# Patient Record
Sex: OTHER/UNKNOWN | Race: WHITE | NOT HISPANIC OR LATINO | URBAN - METROPOLITAN AREA
[De-identification: names, ages, dates, MRNs, and addresses within clinical notes are randomized per-mention and may not be internally consistent; named-entity substitution may affect disease eponyms.]

---

## 2017-08-11 ENCOUNTER — OFFICE VISIT - HEALTHEAST (OUTPATIENT)
Dept: INTERNAL MEDICINE | Facility: CLINIC | Age: 22
End: 2017-08-11

## 2017-08-11 DIAGNOSIS — R45.86 MOOD SWINGS: ICD-10-CM

## 2017-08-11 DIAGNOSIS — R53.83 FATIGUE: ICD-10-CM

## 2017-08-11 DIAGNOSIS — F41.9 ANXIETY: ICD-10-CM

## 2017-08-11 DIAGNOSIS — F32.A DEPRESSION: ICD-10-CM

## 2017-08-11 ASSESSMENT — MIFFLIN-ST. JEOR: SCORE: 1269.78

## 2017-09-06 ENCOUNTER — COMMUNICATION - HEALTHEAST (OUTPATIENT)
Dept: INTERNAL MEDICINE | Facility: CLINIC | Age: 22
End: 2017-09-06

## 2017-09-06 DIAGNOSIS — F41.9 ANXIETY: ICD-10-CM

## 2017-09-06 DIAGNOSIS — R45.86 MOOD SWINGS: ICD-10-CM

## 2017-09-06 DIAGNOSIS — F32.A DEPRESSION: ICD-10-CM

## 2017-09-07 ENCOUNTER — OFFICE VISIT - HEALTHEAST (OUTPATIENT)
Dept: BEHAVIORAL HEALTH | Facility: CLINIC | Age: 22
End: 2017-09-07

## 2017-09-07 ENCOUNTER — COMMUNICATION - HEALTHEAST (OUTPATIENT)
Dept: INTERNAL MEDICINE | Facility: CLINIC | Age: 22
End: 2017-09-07

## 2017-09-07 DIAGNOSIS — F31.9 BIPOLAR I DISORDER (H): ICD-10-CM

## 2017-09-07 DIAGNOSIS — F41.9 ANXIETY: ICD-10-CM

## 2017-09-07 DIAGNOSIS — R45.86 MOOD SWINGS: ICD-10-CM

## 2017-09-07 DIAGNOSIS — F32.A DEPRESSION: ICD-10-CM

## 2017-09-11 ENCOUNTER — COMMUNICATION - HEALTHEAST (OUTPATIENT)
Dept: SCHEDULING | Facility: CLINIC | Age: 22
End: 2017-09-11

## 2017-09-13 ENCOUNTER — COMMUNICATION - HEALTHEAST (OUTPATIENT)
Dept: BEHAVIORAL HEALTH | Facility: CLINIC | Age: 22
End: 2017-09-13

## 2017-09-13 ENCOUNTER — COMMUNICATION - HEALTHEAST (OUTPATIENT)
Dept: INTERNAL MEDICINE | Facility: CLINIC | Age: 22
End: 2017-09-13

## 2017-09-25 ENCOUNTER — COMMUNICATION - HEALTHEAST (OUTPATIENT)
Dept: INTERNAL MEDICINE | Facility: CLINIC | Age: 22
End: 2017-09-25

## 2017-09-25 ENCOUNTER — OFFICE VISIT - HEALTHEAST (OUTPATIENT)
Dept: INTERNAL MEDICINE | Facility: CLINIC | Age: 22
End: 2017-09-25

## 2017-09-25 DIAGNOSIS — F31.9 BIPOLAR 1 DISORDER, DEPRESSED (H): ICD-10-CM

## 2017-09-25 DIAGNOSIS — Z23 NEED FOR VACCINATION: ICD-10-CM

## 2017-09-25 DIAGNOSIS — Z23 NEED FOR IMMUNIZATION AGAINST INFLUENZA: ICD-10-CM

## 2017-09-25 ASSESSMENT — MIFFLIN-ST. JEOR: SCORE: 1270.35

## 2017-10-05 ENCOUNTER — COMMUNICATION - HEALTHEAST (OUTPATIENT)
Dept: BEHAVIORAL HEALTH | Facility: CLINIC | Age: 22
End: 2017-10-05

## 2017-11-20 ENCOUNTER — COMMUNICATION - HEALTHEAST (OUTPATIENT)
Dept: INTERNAL MEDICINE | Facility: CLINIC | Age: 22
End: 2017-11-20

## 2017-11-20 DIAGNOSIS — F31.9 BIPOLAR 1 DISORDER, DEPRESSED (H): ICD-10-CM

## 2017-12-06 ENCOUNTER — OFFICE VISIT - HEALTHEAST (OUTPATIENT)
Dept: BEHAVIORAL HEALTH | Facility: CLINIC | Age: 22
End: 2017-12-06

## 2017-12-06 ENCOUNTER — COMMUNICATION - HEALTHEAST (OUTPATIENT)
Dept: BEHAVIORAL HEALTH | Facility: CLINIC | Age: 22
End: 2017-12-06

## 2017-12-06 DIAGNOSIS — Z30.09 ENCOUNTER FOR OTHER GENERAL COUNSELING OR ADVICE ON CONTRACEPTION: ICD-10-CM

## 2017-12-06 DIAGNOSIS — F31.9 BIPOLAR 1 DISORDER, DEPRESSED (H): ICD-10-CM

## 2017-12-06 ASSESSMENT — MIFFLIN-ST. JEOR: SCORE: 1328.74

## 2017-12-18 ENCOUNTER — COMMUNICATION - HEALTHEAST (OUTPATIENT)
Dept: INTERNAL MEDICINE | Facility: CLINIC | Age: 22
End: 2017-12-18

## 2017-12-18 DIAGNOSIS — F31.9 BIPOLAR 1 DISORDER, DEPRESSED (H): ICD-10-CM

## 2018-02-02 ENCOUNTER — OFFICE VISIT - HEALTHEAST (OUTPATIENT)
Dept: BEHAVIORAL HEALTH | Facility: CLINIC | Age: 23
End: 2018-02-02

## 2018-02-02 DIAGNOSIS — F41.1 GAD (GENERALIZED ANXIETY DISORDER): ICD-10-CM

## 2018-02-02 DIAGNOSIS — F31.9 BIPOLAR 1 DISORDER, DEPRESSED (H): ICD-10-CM

## 2018-02-02 ASSESSMENT — MIFFLIN-ST. JEOR: SCORE: 1320.81

## 2018-03-10 ENCOUNTER — COMMUNICATION - HEALTHEAST (OUTPATIENT)
Dept: BEHAVIORAL HEALTH | Facility: CLINIC | Age: 23
End: 2018-03-10

## 2018-03-10 DIAGNOSIS — F31.9 BIPOLAR 1 DISORDER, DEPRESSED (H): ICD-10-CM

## 2018-06-07 ENCOUNTER — COMMUNICATION - HEALTHEAST (OUTPATIENT)
Dept: BEHAVIORAL HEALTH | Facility: CLINIC | Age: 23
End: 2018-06-07

## 2018-06-13 ENCOUNTER — OFFICE VISIT - HEALTHEAST (OUTPATIENT)
Dept: BEHAVIORAL HEALTH | Facility: CLINIC | Age: 23
End: 2018-06-13

## 2018-06-13 DIAGNOSIS — F31.9 BIPOLAR 1 DISORDER, DEPRESSED (H): ICD-10-CM

## 2018-06-13 DIAGNOSIS — F41.1 GAD (GENERALIZED ANXIETY DISORDER): ICD-10-CM

## 2018-06-13 DIAGNOSIS — R41.89 COGNITIVE CHANGES: ICD-10-CM

## 2018-06-21 ENCOUNTER — COMMUNICATION - HEALTHEAST (OUTPATIENT)
Dept: BEHAVIORAL HEALTH | Facility: CLINIC | Age: 23
End: 2018-06-21

## 2018-07-06 ENCOUNTER — COMMUNICATION - HEALTHEAST (OUTPATIENT)
Dept: BEHAVIORAL HEALTH | Facility: CLINIC | Age: 23
End: 2018-07-06

## 2018-07-24 ENCOUNTER — COMMUNICATION - HEALTHEAST (OUTPATIENT)
Dept: BEHAVIORAL HEALTH | Facility: CLINIC | Age: 23
End: 2018-07-24

## 2018-07-24 DIAGNOSIS — F31.9 BIPOLAR 1 DISORDER, DEPRESSED (H): ICD-10-CM

## 2018-08-08 ENCOUNTER — OFFICE VISIT - HEALTHEAST (OUTPATIENT)
Dept: BEHAVIORAL HEALTH | Facility: CLINIC | Age: 23
End: 2018-08-08

## 2018-08-08 DIAGNOSIS — F31.9 BIPOLAR 1 DISORDER, DEPRESSED (H): ICD-10-CM

## 2018-08-08 DIAGNOSIS — F41.1 GAD (GENERALIZED ANXIETY DISORDER): ICD-10-CM

## 2018-08-08 ASSESSMENT — MIFFLIN-ST. JEOR: SCORE: 1306.06

## 2018-09-28 ENCOUNTER — COMMUNICATION - HEALTHEAST (OUTPATIENT)
Dept: BEHAVIORAL HEALTH | Facility: CLINIC | Age: 23
End: 2018-09-28

## 2018-09-28 DIAGNOSIS — F31.9 BIPOLAR 1 DISORDER, DEPRESSED (H): ICD-10-CM

## 2018-11-01 ENCOUNTER — COMMUNICATION - HEALTHEAST (OUTPATIENT)
Dept: BEHAVIORAL HEALTH | Facility: CLINIC | Age: 23
End: 2018-11-01

## 2018-11-02 ENCOUNTER — COMMUNICATION - HEALTHEAST (OUTPATIENT)
Dept: BEHAVIORAL HEALTH | Facility: CLINIC | Age: 23
End: 2018-11-02

## 2018-11-02 DIAGNOSIS — F31.9 BIPOLAR 1 DISORDER, DEPRESSED (H): ICD-10-CM

## 2018-11-07 ENCOUNTER — COMMUNICATION - HEALTHEAST (OUTPATIENT)
Dept: BEHAVIORAL HEALTH | Facility: CLINIC | Age: 23
End: 2018-11-07

## 2018-11-07 DIAGNOSIS — F31.9 BIPOLAR 1 DISORDER, DEPRESSED (H): ICD-10-CM

## 2021-05-31 VITALS — WEIGHT: 112 LBS | BODY MASS INDEX: 18 KG/M2 | HEIGHT: 66 IN

## 2021-05-31 VITALS — WEIGHT: 118 LBS | HEIGHT: 68 IN | BODY MASS INDEX: 17.88 KG/M2

## 2021-05-31 VITALS — BODY MASS INDEX: 18.02 KG/M2 | HEIGHT: 66 IN | WEIGHT: 112.13 LBS

## 2021-06-01 VITALS — BODY MASS INDEX: 17.13 KG/M2 | WEIGHT: 113 LBS | HEIGHT: 68 IN

## 2021-06-01 VITALS — WEIGHT: 115 LBS | BODY MASS INDEX: 17.49 KG/M2

## 2021-06-01 VITALS — WEIGHT: 116.25 LBS | BODY MASS INDEX: 17.62 KG/M2 | HEIGHT: 68 IN

## 2021-06-06 ENCOUNTER — OFFICE VISIT (OUTPATIENT)
Dept: URGENT CARE | Facility: URGENT CARE | Age: 26
End: 2021-06-06
Payer: COMMERCIAL

## 2021-06-06 VITALS
HEIGHT: 67 IN | DIASTOLIC BLOOD PRESSURE: 68 MMHG | OXYGEN SATURATION: 100 % | WEIGHT: 130 LBS | BODY MASS INDEX: 20.4 KG/M2 | SYSTOLIC BLOOD PRESSURE: 112 MMHG | HEART RATE: 76 BPM

## 2021-06-06 DIAGNOSIS — S61.250A OPEN WOUND OF RIGHT INDEX FINGER DUE TO CAT BITE: Primary | ICD-10-CM
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
## 2021-06-06 PROCEDURE — 99203 OFFICE O/P NEW LOW 30 MIN: CPT | Performed by: PHYSICIAN ASSISTANT

## 2021-06-06 RX ORDER — ALBUTEROL SULFATE 90 UG/1
2 AEROSOL, METERED RESPIRATORY (INHALATION) EVERY 6 HOURS
COMMUNITY
End: 2021-07-21

## 2021-06-06 ASSESSMENT — ENCOUNTER SYMPTOMS
EYES NEGATIVE: 1
NEUROLOGICAL NEGATIVE: 1
PSYCHIATRIC NEGATIVE: 1
GASTROINTESTINAL NEGATIVE: 1
CONSTITUTIONAL NEGATIVE: 1
MUSCULOSKELETAL NEGATIVE: 1
CARDIOVASCULAR NEGATIVE: 1
RESPIRATORY NEGATIVE: 1

## 2021-06-06 ASSESSMENT — MIFFLIN-ST. JEOR: SCORE: 1359.37

## 2021-06-06 NOTE — PROGRESS NOTES
(S61.250A,  W55.01XA) Open wound of right index finger due to cat bite  (primary encounter diagnosis)  Plan: amoxicillin-clavulanate (AUGMENTIN) 875-125 MG         tablet      20 minutes spent on the date of the encounter doing chart review, history and exam, documentation and further activities per the note     See Patient Instructions  Patient Instructions     Patient Education     Cat Bite    A cat bite can cause a wound deep enough to break the skin. In such cases, the wound is cleaned and then sometimes closed. If the wound is closed it is usually not closed completely. This is so that fluid can drain if the wound becomes infected. Often the wound is left open to heal. In addition to wound care, a tetanus shot may be given, if needed.  Home care    Wash your hands well with soap and warm water before and after caring for the wound. This helps lower the risk of infection.    Care for the wound as directed. If a dressing was applied to the wound, be sure to change it as directed.    If the wound bleeds, place a clean, soft cloth on the wound. Then firmly apply pressure until the bleeding stops. This may take up to 5 minutes. Don't release the pressure and look at the wound during this time.    Always get medical attention for cat bites on the hand. They are highly likely to become infected.    Most wounds heal within 10 days. But an infection can occur even with proper treatment. So be sure to check the wound daily for signs of infection (see below).    Antibiotics may be prescribed. These help prevent or treat infection. If you re given antibiotics, take them as directed. Also be sure to complete the medicines.  Rabies prevention  Rabies is a virus that can be carried in certain animals. These can include domestic animals such as cats and dogs. Pets fully vaccinated against rabies (2 shots) are at very low risk of infection. But because human rabies is almost always fatal, any biting pet should be confined for  10 days as an extra precaution. In general, if there is a risk for rabies, the following steps may need to be taken:    If someone s pet cat has bitten you, it should be kept in a secure area for the next 10 days to watch for signs of illness. If the pet owner won t allow this, contact your local animal control center. If the cat becomes ill or dies during that time, contact your local animal control center at once so the animal may be tested for rabies. If the cat stays healthy for the next 10 days, there is no danger of rabies in the animal or you.    If a stray cat bit you, contact your local animal control center. They can give information on capture, quarantine, and animal rabies testing.    If you can t find the animal that bit you in the next 2 days, and if rabies exists in your area, you may need to receive the rabies vaccine series. Call your healthcare provider right away. Or return to the emergency department promptly.    All animal bites should be reported to the local animal control center. If you were not given a form to fill out, you can report this yourself.  Follow-up care  Follow up with your healthcare provider, or as directed.  When to seek medical advice  Call your healthcare provider right away if any of these occur:    Signs of infection:  ? Spreading redness or warmth from the wound  ? Increased pain or swelling  ? Fever of 100.4 F (38 C) or higher, or as directed by your healthcare provider  ? Colored fluid or pus draining from the wound  ? Enlarged lymph nodes above the area that was bitten, such as lymph nodes in the armpit if you were bitten on the hand or arm. This may be a sign of cat-scratch disease (cat-scratch fever).    Signs of rabies infection:  ? Headache  ? Confusion  ? Strange behavior  ? Increased salivating or drooling  ? Seizure    Decreased ability to move any body part near the bite area    Bleeding that can't be stopped after 5 minutes of firm pressure  [employer] last  reviewed this educational content on 5/1/2018 2000-2021 [employer]. All rights reserved. This information is not intended as a substitute for professional medical care. Always follow your healthcare professional's instructions.               Rafael Knapp PA-C  [facility]    Subjective   26 year old who presents to clinic today for the following health issues:    Urgent Care and Trauma       HPI     Patient visits today for single cat bite puncture wound to the tip of her right index finger earlier today. Patient states that the cat was trying to eat a treat out of her hand and misjudged the distance. Cat is fully vaccinated.     Review of Systems   Review of Systems   Constitutional: Negative.    HENT: Negative.    Eyes: Negative.    Respiratory: Negative.    Cardiovascular: Negative.    Gastrointestinal: Negative.    Genitourinary: Negative.    Musculoskeletal: Negative.    Neurological: Negative.    Psychiatric/Behavioral: Negative.         Objective        BP: 112/68 Pulse: 76     SpO2: 100 %       Physical Exam   Physical Exam  Constitutional:       General: She is not in acute distress.     Appearance: Normal appearance. She is normal weight. She is not ill-appearing, toxic-appearing or diaphoretic.   HENT:      Head: Normocephalic and atraumatic.   Cardiovascular:      Rate and Rhythm: Normal rate and regular rhythm.      Pulses: Normal pulses.      Heart sounds: Normal heart sounds. No murmur. No friction rub. No gallop.    Pulmonary:      Effort: Pulmonary effort is normal. No respiratory distress.      Breath sounds: Normal breath sounds. No stridor. No wheezing, rhonchi or rales.   Chest:      Chest wall: No tenderness.   Skin:     Comments: There is a single puncture wound to the tip pad side of the right index finger.   Neurological:      General: No focal deficit present.      Mental Status: She is alert and oriented to person, place, and time. Mental status is  at baseline.      Gait: Gait normal.   Psychiatric:         Mood and Affect: Mood normal.         Behavior: Behavior normal.         Thought Content: Thought content normal.         Judgment: Judgment normal.

## 2021-06-12 NOTE — PROGRESS NOTES
Diagnostic Assessment  [x] Brief  [] Standard    **Date(s): 2017  **Start Time:  12:00  **Stop Time: 2:00    Patient Name: Candace Moy  **Age: 22 y.o.    1995        Referral Source:   Therapist: ELA Coronado        Persons Present: Candace MATOS Farzaneh and ELA Coronado          Patient expectation for treatment:   I'd like to be less anxious about things I shouldn't be anxious about because it impairs functioning, not get depressed and apathetic, gain trust in others.  I have engaged in self-harm behavior since I was 18 years old.  I did some cutting along my arm over this past summer.  I would like to be referred to psychiatry for medication management.  I will seek an individual therapist as needed at [facility] because that will be more convenient and less expensive for me.    Recipient's description of symptoms (including reason for referral):   Patient reports symptoms of depression with panic attacks and some increase in manic symptoms and impulsivity the past several months.  She reports dealing with anxiety since middle school and with depression since high school and that both the depression and anxiety have been worse this past summer.  Symptoms she experiences include distractibility, indecisiveness, forgetfulness, racing thoughts, bothersome thoughts, worries, anxiety, nervousness, apathy, irritability, boredom, mood swings, depressed mood at times, elevated mood at other times, feelings of shame, feelings of guilt, lack of self-confidence, inferiority feelings, times when she procrastinates, need for excessive praise, loss of interest in activities.  She also reports a history of job problems and unstable relationships.  She has a history of sleep problems when depressed sleeping too much and when manic or anxious having trouble sleeping.  She also struggles sporadically with headaches and dizziness and flushes and  chills.  He is unsure whether she is experiencing any hallucinations and thinks that sometimes her thoughts almost sound like voices.    Presenting Problem/History:    Functional Impairments:   Personal: 3  Family: 2  Social:3    Work: 3      How does the presenting problem affect patients daily functioning:    Impairs my schoolwork, I'm an anxious perfectionist and don't get things on time, or get too tired to go to class. Have trouble speaking up in class, get jittery and have panic attacks, 1 in past month, more during school year. Was packing stuff to return to school here and had panic attack    Issues/Stressors:   Patient reports anxiety is problematic it is led her to quit jobs without informing staff why she is quitting.  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  She has some low wounds for her college education and financially she is lacking frons and just getting by.  He is a rolly in college and feels some stress related to passing courses and getting work done on time.  Her anxiety and periods of lack of motivation can lead her to procrastinate and put off completing assignments.  He has a brother who has experienced depression.  She reports she has a hard time taking criticism and can be very annoying and clingy toward others she reports that she becomes indecisive and impulsive when experiencing stress.  She did engage in some cutting this summer of her arms.  She reports not going to work created greater stress in her life because she felt like she was not being productive and lacked any purpose in life.    Mental Status Exam:  Grooming: Well groomed  Attire: Appropriate  Age: Appears Stated  Behavior Towards Examiner: Cooperative  Motor Activity: Within normal   Eye Contact: Appropriate  Mood: Euthymic  Affect: Congruent w/content of speech  Speech/Language: Within normal  Attention: Within  normal  Concentration: Within normal  Thought Process: Within normal  Thought Content: Within noraml  Within normal  Orientation: X 3No Evidence of Impairment  Memory: No Evidence of Impairment  Judgement: No Evidence of Impairment  Estimated Intelligence: Average  Demonstrated Insight: Adequate  Fund of Knowledge: adequate    Current living situation (including household membership and housing status):   Live in a suite dorm at [facility], living room area, and have own bedroom.     Basic needs status including economic status:   Basic needs met but drowning in student loan, parents dont make a lot of money. Didn't hold job this summer, worked only 2 days, at [employer].     Education level:   Senior year at [employer]. Want to go to grad school in a couple of years. Will apply to [employer] for post grad work    Employment status:   Student full time    Significant personal relationships (including recipient's evaluation of relationship quality:  Family, brother, best friend Jackie at [facility], have solid group of friends at school    Strengths and resources (including extent and quality of social networks):  President of the [employer], and in the [employer] club.       Belief system:  Agnostic     Contextual non-personal factors contributing to the recipient's presenting concerns:  Patient reports that she was not a very popular person growing up in that she did not have many friends in middle school and that she was not popular in high school and had a crush on her best friend that was unrecorded.  She reports cutting self harm since the age of 18 years old.  She struggled with anxiety since middle school.  She reports she comes from a stable family system although her brother has experienced depression.  Her father and mother have been  for 25 years she says that there is supportive but that her father's job is required him to move around the world a lot so she is lived in different  places and did not develop stable routed lifestyle at a young age.    General physical health and relationship to recipient's culture:  Pt culturally is open to western medical strategies, techniques and treatments. She is comfortable using medicines and working with medical doctors in this country. She has tariq in western medicine and uses medical doctors and psychotherapy to treat physical and emotional concerns. Have headaches and asthma that is exercise induced    Current medications:  See EPIC    Substance use, abuse, or dependency:  Pt denies any substance use problems or concerns.   Sources/references used in completing this assessment: (face-to-face interview, Patient chart, adult intake questionnaire, etc.)  Face-to-Face interview, Patient chart, adult intake questionnaire    **Psychological Measures:  1. PANSI: Positive ideation score=1.8<3.4; Negative ideation score= 4.2>1.6.  Patient denies suicidal thoughts and/or planning and commits to seeking safety if her is unsafe in the community.  Pt filled out screen 1 week prior and scored high risk but denies any SI intent or plan currently and will speak with MH services and her roommate at her college if suicidal in future.   2. CAGE Aid= score of     0/4    Patient is not enrolled in chemical dependency program and denies substance use problem; no referral made at this time.  3.  WHODAS: moderate problem  4. PHQ-9=score of 23  Patient indicates that their depression sxs make it very difficult to do his work, take care of things at home, or get along with other people.  5. PCL-5=not admin.  6. ANIBAL-7=score of  15 Patient indicates that their anxiety sxs make it somewhat difficult to do his work, take care of things at home, or get along with other people.  7. Mood Disorder Questionnaire (Lifetime)= 2 NO s and 11 Yes responses. Patient indicates it is:  somewhat difficult,  to manage symptoms  8. Mood Disorder Questionnaire (Current)= 4 NO s and 9 Yes  responses. Patient indicates it is: minor problem    WHODAS 2.0 12 Score -item version= 29.17% mod problem  H1= 20  H2= 0  H3= 7  In the last 30 days, patient's level of disability was at 29%       Medical History  Past Medical History:   Diagnosis Date     Depression                Clinical Impressions/Assessment/Recommendations: (Stands alone; is a synopsis of patients story, any impacting family or cultural issue on diagnosis and how patient meets criteria for diagnosis).     Candace Moy provided background information via the Adult Intake Questionnaire, psychological measures (scores are documented at the beginning of this DA), and face-to-face interview.  [facility] medical records were consulted to complete this DA.  The patient was referred to this therapist by Keyonna Patel MD.      Candace Moy is a 22 y.o. White or  female presenting to therapy for assistance with anxiety and depression and manic sx.  The patient advises her desired outcomes of therapy are to secure psychiatric services for med eval to manage MH sx with meds.  Candace Moy indicates her difficulties with anxiety and depression sx began in middle school. Problems with cutting self harm began at age 18. Pt had a crush that was unrequited for best friend in .  The patient has attempted to eliminate or manage these problems by medications, therapy.  The patient reports sporadic success in managing their mental health concerns.      Candace reports a history of anxiety going back to early teens and depression first diagnosed in high school.  She endorses symptoms of bipolar affective disorder type I and is currently concerned over resurgence of manic symptoms.  She has been doing some cutting over the summer and she reports feeling nervous anxious on edge every day and most days not being able to control her worry worrying too much about different things having trouble relaxing being restless  having trouble sleeping feeling easily annoyed and irritable she also identifies 9 symptoms of possible keny that she is currently been experiencing during the last 2 weeks of August these include feeling so good and hyper that other people thought she was not her normal self feeling so irritable that she would get into arguments needing less sleep than usual being more talkative than usual having racing thoughts being easily distracted, having more energy than usual, and being much more active than usual, and engaging in some risky and impulsive behaviors.  She reports irritability and mood swings and racing thoughts and bothersome thoughts and possible auditory hallucinations and distractibility, alternate with inferiority feelings depressed mood boredom apathy and loss of interest in activities.     She lives in a dorm at [facility] and is anxious about the start of the semester and wants to make sure that she is emotionally stable so that she can function well in school.  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  As result she spent the summer feeling out of sync and being on productive while other people she knew worked.  This created some dissidence in her and contributed to her beginning to cut on her arms again.  Candace would like a referral to our psychiatry department for medication management of her bipolar affective disorder.  She will utilize the therapist at [facility] for individual therapy.  This will be more convenient for her and will save her some money.      Based on the information gathered in this diagnostic assessment, the patient's reported symptoms meet criteria for the following DSM-5 Diagnosis and associated rule-outs:      Diagnosis:     BPAD most recent episode mixed with possible psychosis evidenced by  uncertainty over whether she has had some hallucinations      Rule outs include:    ANIBAL with Panic Attacks    BPD      It is recommended that the patient begin individual psychotherapy at [facility] with follow-up appointments scheduled as needed.  Also, the patient will be referred to psychiatry for additional care.      Candace Moy would be best serviced by therapeutic interventions that provide a client centered atmosphere with positive regard.  In addition, the patient may benefit from   CBT, mindfulness and group therapies, along with Motivational Interviewing.        Assessment of client resolving presenting mental health concerns:  Ability  [] low     [x] average     [] high  Motivation [] low     [x] average     [] high  Willingness [] low     [x] average     [] high        Initial Therapy Plan (ex: develop therapeutic relationship with therapist, Refer to psychiatry/psych testing, etc.):    1. Attend psychiatric appointment at [facility] to manage psych meds    2. Secure individual therapist at [facility] for ongoing MH care    3.  Reasonable precautions should be taken to assess patient safety in the community.            Therapist s Signature/Supervision/co-signature statement:   Performed and documented by RENE iMller, LICSW [facility]

## 2021-06-13 NOTE — PROGRESS NOTES
[facility] Follow Up Note    Assessment/Plan:    1. Bipolar 1 disorder, depressed  Overall patient's mood fluctuations are much better on current regimen.  She denies recurrent episodes of hypomania although complains about more fatigue now which could be a relative.  For now I opted not to change any of her medications.  She will continue Lamictal 100 mg and Celexa 10 mg.  I recommended that she establishes care with psychiatry.  She will continue seeing our psychologist  - lamoTRIgine (LAMICTAL) 100 MG tablet; Take 1 tablet (100 mg total) by mouth daily.  Dispense: 30 tablet; Refill: 6    2. Need for immunization against influenza  - Influenza, Seasonal,Quad Inj, 36+ MOS    3. Need for vaccination  - HPV vaccine 9 valent 3 dose IM  - Tdap vaccine,  8yo or older,  IM    Keyonna Patel MD    Chief Complaint:  Chief Complaint   Patient presents with     Follow-up     1 month f/u for depression/anxiety/mood swings        History of Present Illness:  Candace is a 22 y.o. female with history of exercise-induced asthma and mood swings, was recently diagnosed bipolar 1 disorder who is currently here for follow-up.    Since I last saw her we decreased her citalopram from 20-10 mg due to hypomania and started her on Lamictal.  Currently she is on Lamictal 100 mg a day and feels that her mood is multilevel, without too many highs or too many lows.  She still has mild depressive symptoms and overall her PHQ 9 was 14 but mostly due to physical symptoms of fatigue and not sleeping well.  Since she has been on Lamictal she reports that she has been having vivid dreams.  She also wakes up frequently at night for unclear reason but able to fall asleep easily.  Overall she gets total of 6 hours per night.  Her depression has not gotten worse since we decreased citalopram.  She denies any rashes while she has been on Lamictal.  She has been seeing our psychologist.  We discussed that she should also see a  "psychiatrist for further adjustment of her Lamictal and psychiatric medications.  She reports that she has had phone call from some office but has not returned it.  Currently patient is back at school and somewhat stressed.  Overall she feels that mentally she is doing better than in the summer.    Review of Systems:  A comprehensive review of systems was performed and was otherwise negative.  She reports more fatigue recently although it is relative given the fact that she is not getting any more hypomania episodes.    PFSH:  Social History: Reviewed, patient is not sexually active  History   Smoking Status     Never Smoker   Smokeless Tobacco     Never Used     Social History     Social History Narrative    Patient lives with one roommate.  She is a senior in [employer] and majors in English literature.  She is from [place] originally.       Past History: Reviewed  Current Outpatient Prescriptions   Medication Sig Dispense Refill     albuterol (PROAIR HFA) 90 mcg/actuation inhaler Inhale 2 puffs every 6 (six) hours as needed for wheezing.       citalopram (CELEXA) 20 MG tablet Take 20 mg by mouth daily.       hydrOXYzine (VISTARIL) 25 MG capsule Take 25-50 mg by mouth every 6 (six) hours as needed for itching.       lamoTRIgine (LAMICTAL) 100 MG tablet Take 1 tablet (100 mg total) by mouth daily. 30 tablet 6     No current facility-administered medications for this visit.        Family History: Reviewed    Physical Exam:    Vitals:    09/25/17 1620   BP: 94/62   Pulse: 66   Resp: 16   Temp: 98.5  F (36.9  C)   TempSrc: Oral   SpO2: 98%   Weight: 112 lb 2 oz (50.9 kg)   Height: 5' 6\" (1.676 m)     Wt Readings from Last 3 Encounters:   09/25/17 112 lb 2 oz (50.9 kg)   08/11/17 112 lb (50.8 kg)     Body mass index is 18.1 kg/(m^2).    Constitutional:  Reveals a pleasant young female.  Vitals:  Per nursing notes.  HEENT:No cervical LAD, no thyromegaly,  conjunctiva is pink, no scleral icterus, TMs are " visualized and normal bl, oropharynx is clear, no exudates,   Cardiac:  Regular rate and rhythm,no murmurs, rubs, or gallops. Legs without edema. Palpation of the radial pulse regular.  Lungs: Clear to auscultation bl.  Respiratory effort normal.  Abdomen:positive BS, soft, nontender, nondistended.  No hepato-splenomagaly  Skin:   Without rash, bruise, or palpable lesions.  Psychiatric: affect appropriate, memory intact.  She appears to be less anxious today, no flight of ideas or pressured speech, she has a better eye contact today      Data Review:    Analysis and Summary of Old Records (2): Yes    Records Requested (1): No    Other History Summarized (from other people in the room) (2): No    Radiology Tests Summarized (XRAY/CT/MRI/DXA) (1): No    Labs Reviewed (1): Yes    Medicine Tests Reviewed (EKG/ECHO/COLONOSCOPY/EGD) (1): No    Independent Review of EKG or X-RAY (2): No
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
## 2021-06-14 NOTE — PROGRESS NOTES
Pt states she is here due to her bipolar disorder. Pt states she has minimal amount of anxiety rates it as 2/5, pt states she has weekly panic attacks due to anxiety. Pt states her depression is better then it was before. Pt states she had a breakdown this past summer but is doing much better now.pt states she only started her medications since March of this year. Pt denies SI/HI and hallucinations.        Correct pharmacy verified with patient and confirmed in snapshot? [x] yes []no    Medications Phoned  to Pharmacy [] yes [x]no  Name of Pharmacist:  List Medications, including dose, quantity and instructions      Medication Prescriptions given to patient   [] yes  [x] no   List the name of the drug the prescription was written for.      Medications ordered this visit were e-scribed.  Verified by order class [x] yes  [] no  Lamictal, Celexa    Medication changes or discontinuations were communicated to patient's pharmacy:  [] yes  [x] no  Pharmacist Spoke With:     UA collected [] yes  [x] no    [place] Prescription Monitoring Program Reviewed? [x] yes  [] no    Referrals/Labs were made to:     Completed Charge Capture?  [x] yes  [] no    Future appointment was made: [x] yes  [] no  1/24/18  Dictation completed at time of chart check: [x] yes  [] no    I have checked the documentation for today s encounters and the above information has been reviewed and completed.

## 2021-06-15 ENCOUNTER — NURSE TRIAGE (OUTPATIENT)
Dept: NURSING | Facility: CLINIC | Age: 26
End: 2021-06-15

## 2021-06-15 ENCOUNTER — OFFICE VISIT (OUTPATIENT)
Dept: URGENT CARE | Facility: URGENT CARE | Age: 26
End: 2021-06-15
Payer: COMMERCIAL

## 2021-06-15 VITALS
DIASTOLIC BLOOD PRESSURE: 66 MMHG | SYSTOLIC BLOOD PRESSURE: 104 MMHG | OXYGEN SATURATION: 99 % | RESPIRATION RATE: 14 BRPM | HEART RATE: 77 BPM | BODY MASS INDEX: 20.4 KG/M2 | TEMPERATURE: 99.3 F | HEIGHT: 67 IN | WEIGHT: 130 LBS

## 2021-06-15 DIAGNOSIS — L50.9 HIVES: Primary | ICD-10-CM

## 2021-06-15 DIAGNOSIS — T78.3XXA ANGIOEDEMA, INITIAL ENCOUNTER: ICD-10-CM

## 2021-06-15 PROCEDURE — 99213 OFFICE O/P EST LOW 20 MIN: CPT | Performed by: NURSE PRACTITIONER

## 2021-06-15 RX ORDER — PREDNISONE 10 MG/1
40 TABLET ORAL ONCE
Status: COMPLETED | OUTPATIENT
Start: 2021-06-15 | End: 2021-06-15

## 2021-06-15 RX ORDER — PREDNISONE 20 MG/1
TABLET ORAL
Qty: 6 TABLET | Refills: 0 | Status: SHIPPED | OUTPATIENT
Start: 2021-06-15 | End: 2021-07-20

## 2021-06-15 RX ORDER — DIPHENHYDRAMINE HCL 25 MG
50 CAPSULE ORAL ONCE
Status: COMPLETED | OUTPATIENT
Start: 2021-06-15 | End: 2021-06-15

## 2021-06-15 RX ADMIN — Medication 50 MG: at 15:46

## 2021-06-15 RX ADMIN — PREDNISONE 40 MG: 10 TABLET ORAL at 15:48

## 2021-06-15 ASSESSMENT — ENCOUNTER SYMPTOMS
ABDOMINAL PAIN: 0
ARTHRALGIAS: 0
APPETITE CHANGE: 0
HEADACHES: 0
LIGHT-HEADEDNESS: 0
WHEEZING: 0
CONFUSION: 0
CHEST TIGHTNESS: 0
FATIGUE: 0
PALPITATIONS: 0
PARESTHESIAS: 0
DIZZINESS: 0
COUGH: 0
CHILLS: 0
WEAKNESS: 0
SHORTNESS OF BREATH: 0
ACTIVITY CHANGE: 0
DIAPHORESIS: 0
VOMITING: 0
COLOR CHANGE: 1
NAUSEA: 0
MYALGIAS: 0
FEVER: 0
NUMBNESS: 0
NERVOUS/ANXIOUS: 1

## 2021-06-15 ASSESSMENT — MIFFLIN-ST. JEOR: SCORE: 1359.37

## 2021-06-15 NOTE — PATIENT INSTRUCTIONS
Prednisone and benadryl in clinic or hives and angioedema  Watchful waiting for 1-2 days for anaphylaxis  ER for Epi if severe shortness of breath, throat closure, heart palpitations, dizziness, nausea, faint  Take an antihistamine such as Claritin, Zyrtec or Allegra (loratadine, cetirizine and fexofenadine) daily for 5-7 days   Benadryl at night as needed for sleep  Avoid scratching.  Cool packs and cool showers.  Calamine lotion as needed  May apply over the counter hydrocortisone cream as needed for itch.  Discussed watching for more severe symptoms, including shortness of breath, swelling of lips, tongue, diffuculty breathing or worsening hives. Must be seen in emergency room immediately or call 911.   Follow up with primary care provider if no improvement.    Urticaria is common, affecting up to 20 percent of the population. A presumptive trigger, such as common viral and bacterial infections, medications, food ingestion, or insect sting, can sometimes be identified for new-onset urticaria. Viral tends to not be highly pruritic. In patients with mild symptoms of new-onset urticaria, we suggest treatment with a nonsedating H1 antihistamine alone. In patients at low risk of complications from anticholinergic side effects (ie, young, healthy patients), use of a sedating H1 antihistamine at bedtime and a nonsedating H1 antihistamine during the day is a reasonable alternative. In patients with moderate-to-severe new-onset urticaria, we suggest adding an H2 antihistamine. In patients with prominent angioedema or persistent symptoms despite an H1 and H2 antihistamine, we suggest adding a brief course of oral glucocorticoids. We typically administer prednisone (30 to 60 mg daily) in adults or prednisolone (0.5 to 1 mg/kg/day) in children, tapered over five to seven days (Upto[employer], 2019).    Patient Education     Angioedema  Angioedema (AX-mww-rz-eh-MIRIAM-muh) is a sudden appearance of swollen patches (edema) on the skin  or mucous membranes. It most often involves the face, lips, mouth, tongue, back of throat, or vocal cords. It may also occur in other places, such as the arms, legs, or genitals. A rash may also appear during the first 4 days of this illness.  There are different types of angioedema. Sometimes angioedema is part of an allergic reaction (allergic angioedema). Other times angioedema is present without any other signs of allergic reaction (isolated angioedema). Your symptoms will depend on what type of angioedema you have. Like allergic reactions, angioedema may include:    Rash, hives, redness, welts, blisters    Itching, burning, stinging, pain    Dry, flaky, cracking, or scaly skin    Swelling of the face, lips, tongue, or other parts of the body  More severe symptoms may include:    Trouble swallowing, or feeling like your throat is closing    Trouble breathing or wheezing    Hoarse voice or trouble speaking    Nausea, vomiting, diarrhea, or stomach cramps    Feeling faint or lightheaded, rapid heart rate, or low blood pressure  Angioedema can be triggered by exposure to certain substances. Medical conditions involving the immune systems and certain infections may cause it. In rare cases, angioedema can be hereditary. Sometimes the cause may be very clear. However, it's often hard to find a cause. The most common causes of allergic angioedema include:    Foods, such as shrimp, shellfish, peanuts, milk products, gluten, and eggs; also colorings, flavorings, and additives    Insect bites or stings, from bees, mosquitoes, fleas, or ticks    Medicines, such as ACE inhibitors, penicillin medicines, sulfa medicines, , aspirin, and ibuprofen    Latex, which may be in gloves, clothes, toys, balloons, and some kinds of tape. People who are allergic to latex may have problems with foods such as bananas, avocados, kiwi, papaya, or chestnuts.    Stress    Heat, cold, or sunlight  The most common cause of angioedema is a  reaction to a class of medicines called ACE inhibitors. These are used to treat high blood pressure. ACE inhibitors include lotensin, captopril, enalapril, and lisinopril. Angiodema can happen even after you have been taking the medicine for some time. Tell your healthcare provider if you have angioedema symptoms and are taking any of these medicines. Angioedema may recur. It's important to watch for the earliest signs of this condition (see the list below). Contact your healthcare provider right away if swelling involves the face, mouth, or throat.  Home care  Rest quietly today. Don't do vigorous physical activity.  Medicines. The healthcare provider may prescribe medicines for itching, swelling, or pain. Follow the healthcare provider s instructions when taking these medicines.    Oral diphenhydramine is an antihistamine available without a prescription. Unless a prescription antihistamine was given, diphenhydramine may be used to reduce widespread itching. It may make you sleepy, so be careful using it when going to school, working, or driving. (Note: Don't use diphenhydramine if you have glaucoma or if you are a man who has trouble urinating due to an enlarged prostate.) Loratadine is an antihistamine that may cause less drowsiness.    Don't use diphenhydramine cream on your skin. Some people can have an allergic reaction to this.    Calamine lotion or oatmeal baths sometimes help with itching.    You may use acetaminophen or ibuprofen for pain, unless another pain medicine was prescribed.    If you were told that your angioedema was caused by a medicine you are taking, you must stop taking it. Ask your healthcare provider for a different one. In the future, advise medical staff that you are allergic to this medicine.    If medicine was prescribed, such as steroids or antihistamines, be sure you understand what the medicine is and how to take it.   General care    Make sure you don't scratch areas of the body  that had a reaction. This will help prevent infection.     Stay away from air pollution, tobacco, and wood smoke. Also stay away from cold temperatures. These things can make allergy symptoms worse.    Try to find out what caused your reaction. Make sure to remove the allergen. Future reactions may be worse.     If you have a serious allergy, wear a medical alert bracelet that notes this allergy.    If the healthcare provider prescribed an epinephrine auto injector kit, keep it with you at all times.     Tell all care providers about your allergy. Ask them how to use any prescribed medicines.    Keep a record of allergies and symptoms, and when they occurred. This will help your provider treat you over time.     Follow-up care  Follow up with your healthcare provider, or as advised. You may need to see an allergist. An allergist can help find the cause of an allergic reaction and give recommendations on how to prevent future reactions.  Call 911  Call 911 right away if any of these occur:    Trouble breathing or swallowing, or wheezing    Hoarse voice or trouble speaking, or drooling    Chest pain or tightness    Confusion, lightheadedness, or dizziness    Extreme drowsiness or trouble awakening    Fainting or loss of consciousness    Rapid heart rate    Vomiting blood, or large amounts of blood in stool    Seizure    Nausea, vomiting, diarrhea, abdominal pain, or stomach cramps  When to seek medical attention  Call your healthcare provider right away if any of the following occur:    Symptoms don't go away    Symptoms come back    Symptoms get worse or new symptoms develop    Hives feel uncomfortable    Fever of 100.4 F (38 C), or as directed by your healthcare provider  [employer] last reviewed this educational content on 8/1/2019 2000-2021 [employer]. All rights reserved. This information is not intended as a substitute for professional medical care. Always follow your healthcare professional's  instructions.

## 2021-06-15 NOTE — PROGRESS NOTES
"Psychiatric  Progress Note  Date of visit:2/2/2018         Discussion of Care and Treatment Recommendations:   This is a 23 y.o. female with a history of bipolar affective disorder who presents to the clinic today to establish psychiatric care for medication management.  Patient also has a history of  self-harm by cutting since the age of 18.      Last visit 12/6/17  Recommendation at last visit .    1.Recommends Psychotherapy:   She will utilize the therapist at [facility] for individual therapy  2. Ambulatory Referral to [facility]   3-Continue with current medication : Lamictal 100 mg daily , Celexa 10 mg daily , Hydroxyzine 25 mg - 50 mg every 6 hrs as needed - Anxiety   6- RTC- 6 weeks , call in between visit with any questions or concerns         Patient and I reviewed diagnosis and treatment plan and patient agrees with following recommendations:  Ongoing education given regarding diagnostic and treatment options with adequate verbalization of understanding.  Plan   1.Recommends Psychotherapy:   She will utilize the therapist at [facility] for individual therapy  2. Ambulatory Referral to [facility]   3-Continue with current medication : Lamictal 100 mg daily , Celexa 10 mg daily , Hydroxyzine 25 mg - 50 mg every 6 hrs as needed - Anxiety   6- RTC- 6 weeks , call in between visit with any questions or concerns              Diagnoses:     Impression:      Bipolar affective Disorder       R/O ANIBAL, BPD             Chief Complaint / Subjective:    Chief complaint: \"I am doing okay\"     History of Present Illness:  Per patient statement-her anxiety is down she is feeling better in this new semester as she was able to pass her classes last semester.  She has an upcoming appointment with the psychologist at school next week and hopes to start psychotherapy soon.  She has been taking her medications consistently and denies any side effects.  She has not had any " panic attacks even though she has experienced some anxiety and was able to utilize hydroxyzine as needed with relief.  She is dating a boy and is sexually active.  During our last visit we discussed the importance of birth control.  She is yet to make an appointment with her primary care for this.  I again educated her on the importance of birth control and the possibility of endangering and unborn baby while on neuroleptic medications.  She endorsed understanding.  She tells me she will call her PCP this week to make that appointment.  She denies suicidal homicidal ideations.  She tells me she feels safe she verbally contracts for safety.  She denies keny or hypomania symptoms.  She denies paranoia or delusions.  She denies depressions.  She tells me anxiety is under control with her current medications.  I will make no changes to her current medications.  I have renewed her prescriptions.  I will have her return in 8 weeks for follow-up appointment and I encouraged her to call in between visits with any questions or concerns.    Mental Status Examination:   General: Adequate hygiene, cooperative  Speech: Normal in rate and tone  Language: Intact  Thought process: Coherent  Thought content:                           Auditory hallucinations- absent                           Visual Hallucinations - Absent                           Delusions Absent                           Loose Associations:  No                          Suicidal thoughts: Absent                          Homicidal thoughts: Absent                       Affect: Neutral  Mood: Neutral   Intellectual functioning: Within normal limits  Memory: Within normal limits  Fund of knowledge: Average  Attention and concentration: Within normal limits  Gait: Steady  Psychomotor activity: Calm, no agitation  Muscles: No atrophy, no abnormal movements  InSight and judgment: Fair    Medication changes: See Above   Medication adherence: compliant  Medication side  "effects: absent  Information about medications: Side effects, benefits and alternative treatments discussed and patient agrees with capacity to do so.    Psychotherapy: Supportive therapy day-to-day living    Education: Diet, exercise, abstinence from drugs and alcohol, patient will not drive if sedated and medications or  under influence of any substance    Lab Results:   Personally reviewed and discussed with the patient    Lab Results   Component Value Date    WBC 4.4 08/11/2017    HGB 13.0 08/11/2017    HCT 39.0 08/11/2017     08/11/2017    ALT 7 08/11/2017    AST 15 08/11/2017     08/11/2017    K 3.8 08/11/2017     08/11/2017    CREATININE 0.82 08/11/2017    BUN 12 08/11/2017    CO2 22 08/11/2017    TSH 3.98 08/11/2017       Vital signs:  Vitals:    02/02/18 0844   BP: 96/54   Patient Site: Left Arm   Patient Position: Sitting   Cuff Size: Adult Regular   Pulse: (!) 59   Temp: 97.6  F (36.4  C)   TempSrc: Oral   Weight: 116 lb 4 oz (52.7 kg)   Height: 5' 8\" (1.727 m)     Allergies: Other food allergy         Medications:     Current Outpatient Prescriptions on File Prior to Visit   Medication Sig Dispense Refill     albuterol (PROAIR HFA) 90 mcg/actuation inhaler Inhale 2 puffs every 6 (six) hours as needed for wheezing.       citalopram (CELEXA) 10 MG tablet Take 1 tablet (10 mg total) by mouth daily. 30 tablet 1     hydrOXYzine (VISTARIL) 25 MG capsule Take 25-50 mg by mouth every 6 (six) hours as needed for itching.       lamoTRIgine (LAMICTAL) 100 MG tablet Take 1 tablet (100 mg total) by mouth daily. 30 tablet 1     No current facility-administered medications on file prior to visit.             Review of Systems:    Otherwise reminder of review of systems is negative    Coordination of Care:   More than 25 minutes spent on this visit  with more than 50% of time spent on coordination of care including: Educating patient about diagnosis, prognosis, side effects and benefits of " medications, diet, exercise.  Time also spent on entering orders and preparing documentation for the visit.Time also spent providing supportive therapy regarding above issues.    This note was created using a dictation system. All typing errors or contextual distortion is unintentional and software inherent.

## 2021-06-15 NOTE — PROGRESS NOTES
Correct pharmacy verified with patient and confirmed in snapshot? [x] yes []no    Charge captured ? [x] yes  [] no    Medications Phoned  to Pharmacy [] yes [x]no  Name of Pharmacist:  List Medications, including dose, quantity and instructions      Medication Prescriptions given to patient   [] yes  [x] no   List the name of the drug the prescription was written for.       Medications ordered this visit were e-scribed.  Verified by order class [x] yes  [] no    Medication changes or discontinuations were communicated to patient's pharmacy: [] yes  [x] no   n/a  UA collected [] yes  [x] no    [place] Prescription Monitoring Program Reviewed? [] yes  [x] no    Referrals were made to:  n/a  Future appointment was made: [x] yes  [] no   4/6/18  Dictation completed at time of chart check: [x] yes  [] no    I have checked the documentation for today s encounters and the above information has been reviewed and completed.     PROVIDER:[TOKEN:[[id number]]]

## 2021-06-15 NOTE — TELEPHONE ENCOUNTER
Patient reports that she woke up 2 hours ago with hives on her arms and legs. Patient reports that her lips are slightly swollen and feel odd. She denies any tongue swelling or difficulty breathing. Patient did not eat any new foods or can think of anything different she has encountered. She does report that she has been on Augmentin for over a week, has a couple of days of medication left.    Patient is advised to go to  for evaluation at this time due to lips being involved and currently on antibiotics. Patient is agreeable with plan and denies further questions at this time.    Taylor Deluca RN  [facility]        Reason for Disposition    Patient sounds very sick or weak to the triager    Additional Information    Negative: Difficulty breathing or wheezing now    Negative: Rapid onset of swollen tongue    Negative: Rapid onset of hoarseness or cough    Negative: Very weak (can't stand)    Negative: Difficult to awaken or acting confused (e.g., disoriented, slurred speech)    Negative: Life-threatening reaction (anaphylaxis) in the past to similar substance (e.g., food, insect bite/sting, chemical, etc.) and < 2 hours since exposure    Negative: Sounds like a life-threatening emergency to the triager    Negative: Bee, wasp, or yellow jacket sting within last 24 hours    Negative: Taking a new medicine now or within last 3 days (Exception: antihistamine, decongestant or other OTC cough/cold medicines)    Negative: Doesn't match the SYMPTOMS of hives    Negative: Swollen tongue    Negative: Widespread hives, itching, or facial swelling and onset < 2 hours of exposure to high-risk allergen (e.g., 1st dose of antibiotic, nuts, sting)    Negative: Difficulty breathing or wheezing    Negative: Hoarseness or cough that started soon after 1st dose of drug    Negative: Swollen tongue that started soon after first dose of drug    Negative: Fever and purple or blood-colored spots or dots     Negative: Too weak or sick to stand    Negative: Sounds like a life-threatening emergency to the triager    Negative: Rash is only on 1 part of the body (localized)    Negative: Taking new non-prescription (OTC) antihistamine, decongestant, ear drops, eye drops, or other OTC cough/cold medicine    Negative: Taking new prescription antihistamine, allergy medicine, asthma medicine, eye drops, ear drops or nose drops    Negative: Rash started more than 3 days after stopping new prescription medicine    Negative: Swollen tongue    Negative: Widespread hives and onset < 2 hours of exposure to 1st dose of drug    Negative: Patient sounds very sick or weak to the triager    Face or lip swelling    Negative: Fever    Protocols used: HIVES-A-OH, RASH - WIDESPREAD WHILE ON DRUGS-A-OH    COVID 19 Nurse Triage Plan/Patient Instructions    Please be aware that novel coronavirus (COVID-19) may be circulating in the community. If you develop symptoms such as fever, cough, or SOB or if you have concerns about the presence of another infection including coronavirus (COVID-19), please contact your health care provider or visit [email or web address].     Disposition/Instructions    In-Person Visit with provider recommended. Reference Visit Selection Guide.    Thank you for taking steps to prevent the spread of this virus.  o Limit your contact with others.  o Wear a simple mask to cover your cough.  o Wash your hands well and often.    Resources    [facility]: About COVID-19: [email or web address]    CDC: What to Do If You're Sick: www.cdc.gov/coronavirus/2019-ncov/about/steps-when-sick.html    CDC: Ending Home Isolation: www.cdc.gov/coronavirus/2019-ncov/hcp/disposition-in-home-patients.html     CDC: Caring for Someone: www.cdc.gov/coronavirus/2019-ncov/if-you-are-sick/care-for-someone.html     [facility]: Interim Guidance for Hospital Discharge to Home:  [email or web address]/hcp/hospdischarge.pdf    [facility] clinical trials (COVID-19 research studies): [email or web address]/umn-clinical-trials     Below are the COVID-19 hotlines at the [facility] of Health ([facility]). Interpreters are available.   o For health questions: Call 722-076-4509 or 1-683.242.2544 (7 a.m. to 7 p.m.)  o For questions about schools and childcare: Call [phone] or 1-819.990.8097 (7 a.m. to 7 p.m.)

## 2021-06-15 NOTE — PROGRESS NOTES
Chief Complaint   Patient presents with     Urgent Care     Derm Problem     woke up with rash all over, itching. Some swelling with lips earlier. Has been Augmentin since June 6     SUBJECTIVE:  Candace Moy is a 26 year old female who presents to the clinic today with her friend with an itchy rash since this morning. It is highly pruritic and covers her entire body including her lips. Noted mild lip swelling today, but it appears there are hives on the lips. She is on day 9 of Augmentin following a cat bite. Denies shortness of breath, throat closure, palpitations, dizziness, nausea, faint feeling. Tried atarax earlier.    No past medical history on file.  Fexofenadine HCl (ALLERGY 24-HR PO),   albuterol (PROAIR HFA/PROVENTIL HFA/VENTOLIN HFA) 108 (90 Base) MCG/ACT inhaler, Inhale 2 puffs into the lungs every 6 hours  VITAMIN D PO,     No current facility-administered medications on file prior to visit.     Social History     Tobacco Use     Smoking status: Never Smoker     Smokeless tobacco: Never Used   Substance Use Topics     Alcohol use: Not on file     Allergies   Allergen Reactions     Banana Anaphylaxis     Augmentin Rash     Drug eruption rash, hives, lip swelling on day 9 of Augmentin. Also notes potential rash when little with Amox.     Other Food Allergy      Bananas-tongue swells up and mouth gets itchy.      Review of Systems   Constitutional: Negative for activity change, appetite change, chills, diaphoresis, fatigue and fever.   Respiratory: Negative for cough, chest tightness, shortness of breath and wheezing.    Cardiovascular: Negative for chest pain, palpitations and peripheral edema.   Gastrointestinal: Negative for abdominal pain, nausea and vomiting.   Musculoskeletal: Negative for arthralgias and myalgias.   Skin: Positive for color change and rash.   Neurological: Negative for dizziness, weakness, light-headedness, numbness, headaches and paresthesias.   Psychiatric/Behavioral:  "Negative for confusion. The patient is nervous/anxious.      EXAM:   /66   Pulse 77   Temp 99.3  F (37.4  C) (Oral)   Resp 14   Ht 1.689 m (5' 6.5\")   Wt 59 kg (130 lb)   LMP 05/17/2021   SpO2 99%   BMI 20.67 kg/m      Physical Exam  Vitals signs reviewed.   Constitutional:       General: She is not in acute distress.     Appearance: Normal appearance. She is not ill-appearing, toxic-appearing or diaphoretic.   HENT:      Head: Normocephalic and atraumatic.      Mouth/Throat:      Mouth: Mucous membranes are moist.      Pharynx: Oropharynx is clear. No oropharyngeal exudate or posterior oropharyngeal erythema.      Comments: Lips with pinpoint hives, drug eruption rash on them. Tongue and airway perfectly normal.  Cardiovascular:      Rate and Rhythm: Normal rate.      Pulses: Normal pulses.   Pulmonary:      Effort: Pulmonary effort is normal. No respiratory distress.      Breath sounds: Normal breath sounds. No stridor. No wheezing.   Musculoskeletal: Normal range of motion.   Skin:     General: Skin is warm and dry.      Findings: Erythema and rash present.   Neurological:      General: No focal deficit present.      Mental Status: She is alert and oriented to person, place, and time.      Cranial Nerves: No cranial nerve deficit.      Sensory: No sensory deficit.      Motor: No weakness.      Gait: Gait normal.   Psychiatric:         Mood and Affect: Mood normal.         Behavior: Behavior normal.       ASSESSMENT:    ICD-10-CM    1. Hives  L50.9 predniSONE (DELTASONE) tablet 40 mg     diphenhydrAMINE (BENADRYL) capsule 50 mg     predniSONE (DELTASONE) 20 MG tablet   2. Angioedema, initial encounter  T78.3XXA predniSONE (DELTASONE) tablet 40 mg     diphenhydrAMINE (BENADRYL) capsule 50 mg     predniSONE (DELTASONE) 20 MG tablet     PLAN:  Patient Instructions   Prednisone and benadryl in clinic or hives and angioedema  Watchful waiting for 1-2 days for anaphylaxis  ER for Epi if severe shortness of " breath, throat closure, heart palpitations, dizziness, nausea, faint  Take an antihistamine such as Claritin, Zyrtec or Allegra (loratadine, cetirizine and fexofenadine) daily for 5-7 days   Benadryl at night as needed for sleep  Avoid scratching.  Cool packs and cool showers.  Calamine lotion as needed  May apply over the counter hydrocortisone cream as needed for itch.  Discussed watching for more severe symptoms, including shortness of breath, swelling of lips, tongue, diffuculty breathing or worsening hives. Must be seen in emergency room immediately or call 911.   Follow up with primary care provider if no improvement.    Urticaria is common, affecting up to 20 percent of the population. A presumptive trigger, such as common viral and bacterial infections, medications, food ingestion, or insect sting, can sometimes be identified for new-onset urticaria. Viral tends to not be highly pruritic. In patients with mild symptoms of new-onset urticaria, we suggest treatment with a nonsedating H1 antihistamine alone. In patients at low risk of complications from anticholinergic side effects (ie, young, healthy patients), use of a sedating H1 antihistamine at bedtime and a nonsedating H1 antihistamine during the day is a reasonable alternative. In patients with moderate-to-severe new-onset urticaria, we suggest adding an H2 antihistamine. In patients with prominent angioedema or persistent symptoms despite an H1 and H2 antihistamine, we suggest adding a brief course of oral glucocorticoids. We typically administer prednisone (30 to 60 mg daily) in adults or prednisolone (0.5 to 1 mg/kg/day) in children, tapered over five to seven days ([employer], 2019).    Patient Education     Angioedema  Angioedema (IK-ubp-cs-eh-MIRIAM-muh) is a sudden appearance of swollen patches (edema) on the skin or mucous membranes. It most often involves the face, lips, mouth, tongue, back of throat, or vocal cords. It may also occur in other  places, such as the arms, legs, or genitals. A rash may also appear during the first 4 days of this illness.  There are different types of angioedema. Sometimes angioedema is part of an allergic reaction (allergic angioedema). Other times angioedema is present without any other signs of allergic reaction (isolated angioedema). Your symptoms will depend on what type of angioedema you have. Like allergic reactions, angioedema may include:    Rash, hives, redness, welts, blisters    Itching, burning, stinging, pain    Dry, flaky, cracking, or scaly skin    Swelling of the face, lips, tongue, or other parts of the body  More severe symptoms may include:    Trouble swallowing, or feeling like your throat is closing    Trouble breathing or wheezing    Hoarse voice or trouble speaking    Nausea, vomiting, diarrhea, or stomach cramps    Feeling faint or lightheaded, rapid heart rate, or low blood pressure  Angioedema can be triggered by exposure to certain substances. Medical conditions involving the immune systems and certain infections may cause it. In rare cases, angioedema can be hereditary. Sometimes the cause may be very clear. However, it's often hard to find a cause. The most common causes of allergic angioedema include:    Foods, such as shrimp, shellfish, peanuts, milk products, gluten, and eggs; also colorings, flavorings, and additives    Insect bites or stings, from bees, mosquitoes, fleas, or ticks    Medicines, such as ACE inhibitors, penicillin medicines, sulfa medicines, , aspirin, and ibuprofen    Latex, which may be in gloves, clothes, toys, balloons, and some kinds of tape. People who are allergic to latex may have problems with foods such as bananas, avocados, kiwi, papaya, or chestnuts.    Stress    Heat, cold, or sunlight  The most common cause of angioedema is a reaction to a class of medicines called ACE inhibitors. These are used to treat high blood pressure. ACE inhibitors include lotensin,  captopril, enalapril, and lisinopril. Angiodema can happen even after you have been taking the medicine for some time. Tell your healthcare provider if you have angioedema symptoms and are taking any of these medicines. Angioedema may recur. It's important to watch for the earliest signs of this condition (see the list below). Contact your healthcare provider right away if swelling involves the face, mouth, or throat.  Home care  Rest quietly today. Don't do vigorous physical activity.  Medicines. The healthcare provider may prescribe medicines for itching, swelling, or pain. Follow the healthcare provider s instructions when taking these medicines.    Oral diphenhydramine is an antihistamine available without a prescription. Unless a prescription antihistamine was given, diphenhydramine may be used to reduce widespread itching. It may make you sleepy, so be careful using it when going to school, working, or driving. (Note: Don't use diphenhydramine if you have glaucoma or if you are a man who has trouble urinating due to an enlarged prostate.) Loratadine is an antihistamine that may cause less drowsiness.    Don't use diphenhydramine cream on your skin. Some people can have an allergic reaction to this.    Calamine lotion or oatmeal baths sometimes help with itching.    You may use acetaminophen or ibuprofen for pain, unless another pain medicine was prescribed.    If you were told that your angioedema was caused by a medicine you are taking, you must stop taking it. Ask your healthcare provider for a different one. In the future, advise medical staff that you are allergic to this medicine.    If medicine was prescribed, such as steroids or antihistamines, be sure you understand what the medicine is and how to take it.   General care    Make sure you don't scratch areas of the body that had a reaction. This will help prevent infection.     Stay away from air pollution, tobacco, and wood smoke. Also stay away from  cold temperatures. These things can make allergy symptoms worse.    Try to find out what caused your reaction. Make sure to remove the allergen. Future reactions may be worse.     If you have a serious allergy, wear a medical alert bracelet that notes this allergy.    If the healthcare provider prescribed an epinephrine auto injector kit, keep it with you at all times.     Tell all care providers about your allergy. Ask them how to use any prescribed medicines.    Keep a record of allergies and symptoms, and when they occurred. This will help your provider treat you over time.     Follow-up care  Follow up with your healthcare provider, or as advised. You may need to see an allergist. An allergist can help find the cause of an allergic reaction and give recommendations on how to prevent future reactions.  Call 911  Call 911 right away if any of these occur:    Trouble breathing or swallowing, or wheezing    Hoarse voice or trouble speaking, or drooling    Chest pain or tightness    Confusion, lightheadedness, or dizziness    Extreme drowsiness or trouble awakening    Fainting or loss of consciousness    Rapid heart rate    Vomiting blood, or large amounts of blood in stool    Seizure    Nausea, vomiting, diarrhea, abdominal pain, or stomach cramps  When to seek medical attention  Call your healthcare provider right away if any of the following occur:    Symptoms don't go away    Symptoms come back    Symptoms get worse or new symptoms develop    Hives feel uncomfortable    Fever of 100.4 F (38 C), or as directed by your healthcare provider  Susan last reviewed this educational content on 8/1/2019 2000-2021 [employer]. All rights reserved. This information is not intended as a substitute for professional medical care. Always follow your healthcare professional's instructions.             Follow up with primary care provider with any problems, questions or concerns or if symptoms worsen or fail to  improve. Patient agreed to plan and verbalized understanding.    YOHANA Sharif-[facility]

## 2021-06-18 NOTE — PROGRESS NOTES
"Psychiatric  Progress Note  Date of visit:6/13/2018         Discussion of Care and Treatment Recommendations:   This is a 23 y.o. female with a history of bipolar affective disorder who presents to the clinic today for follow-up appointment for psychiatric medication management.  Patient also has a history of  self-harm by cutting since the age of 18.       Last visit 2/20/18.  Recommendation at last visit .  1.Recommends Psychotherapy:   She will utilize the therapist at [facility] for individual therapy  2. Ambulatory Referral to OBGYN - Birth Control Options   3-Continue with current medication : Lamictal 100 mg daily , Celexa 10 mg daily , Hydroxyzine 25 mg - 50 mg every 6 hrs as needed - Anxiety   6- RTC- 6 weeks , call in between visit with any questions or concerns   Patient and I reviewed diagnosis and treatment plan and patient agrees with following recommendations:  Ongoing education given regarding diagnostic and treatment options with adequate verbalization of understanding.    Plan   1. .Recommends Psychotherapy:    She is seeing a therapist at [facility]   2.Highly recommend Call PCP or OBGYN  - Birth Control Options   3-Continue with current medication : Lamictal 100 mg daily , Celexa 10 mg daily , Hydroxyzine 25 mg - 50 mg every 6 hrs as needed - Anxiety   5- Ambulatory referral to psychology : Neuro psych evaluation for cognitive changes - increased forgetfulness   6- RTC- 4-6 weeks , call in between visit with any questions or concerns          Diagnoses:   Bipolar affective Disorder        R/O ANIBAL, BPD            Chief Complaint / Subjective:    Chief complaint: \" I have noticed increased forgetfulness     History of Present Illness:   Patient reports that she graduated and now has a part-time job and therefore is able to see a therapist.  She establish care with a therapist and is meeting with her on a weekly and sometimes biweekly basis.  Reports therapy is going well.  She " continues to experience some depression and anxiety but reports that his current medications are helpful.  It is a transitioning time for her and her boyfriend is away and she has new roommates so she is attributing this feelings to these change.  However she is concerned that she has noticed for the past several months that she has been more forgetful will forget her appointments will forget her phone and keys she would be in the middle of conversation and forget what she was going to see next.  This is raising her arm and she would like me to evaluate further.  She is denying any drug use or any use of mood altering substances.  I propose that we do a neuro psych evaluation to start and start there before we make further recommendations.  She is agreeable to these.  She reports compliance with her current medications and denies any side effects.  She would like to continue the same medications and does not wish to make any changes today.  I have renewed her prescriptions I will have her return in 4-6 weeks for follow-up appointment.  I recommended that she utilize nurse only clinic as needed and also call in between visits with any questions or concerns.  I also highly recommended that she contact her primary care provider for birth control options or her OB/GYN.  She tells me that now that she has more time she will follow through with these.  She also understands the risks of being on psychiatric medications with pregnancy.  Currently she says her boyfriend is away for few months therefore she is not sexually active but if she will she will be using condoms.  She denies many hypomania symptoms denied delusions hallucinations or paranoia.  Denies suicidal homicidal ideations.  She tells me she feels safe she does not appear to be in any apparent distress or appear to be in any imminent danger to herself or others and she verbally contracts for safety.    Mental Status Examination:   General: Adequate hygiene,  cooperative  Speech: Normal in rate and tone  Language: Intact  Thought process: Coherent  Thought content:                           Auditory hallucinations- absent                           Visual Hallucinations - Absent                           Delusions Absent                           Loose Associations:  No                          Suicidal thoughts: Absent                          Homicidal thoughts: Absent                       Affect: Neutral  Mood: Neutral   Intellectual functioning: Within normal limits  Memory: Within normal limits  Fund of knowledge: Average  Attention and concentration: Within normal limits  Gait: Steady  Psychomotor activity: Calm, no agitation  Muscles: No atrophy, no abnormal movements  InSight and judgment: Fair    Medication changes: See Above   Medication adherence: compliant  Medication side effects: absent  Information about medications: Side effects, benefits and alternative treatments discussed and patient agrees with capacity to do so.    Psychotherapy: Supportive therapy day-to-day living    Education: Diet, exercise, abstinence from drugs and alcohol, patient will not drive if sedated and medications or  under influence of any substance    Lab Results:   Personally reviewed and discussed with the patient    Lab Results   Component Value Date    WBC 4.4 08/11/2017    HGB 13.0 08/11/2017    HCT 39.0 08/11/2017     08/11/2017    ALT 7 08/11/2017    AST 15 08/11/2017     08/11/2017    K 3.8 08/11/2017     08/11/2017    CREATININE 0.82 08/11/2017    BUN 12 08/11/2017    CO2 22 08/11/2017    TSH 3.98 08/11/2017       Vital signs:    Vitals:    06/13/18 1124   BP: 105/59   Patient Site: Left Arm   Patient Position: Sitting   Cuff Size: Adult Regular   Pulse: 77   Weight: 115 lb (52.2 kg)     Allergies:   Allergies   Allergen Reactions     Other Food Allergy      Bananas-tongue swells up and mouth gets itchy.           Medications:     Current Outpatient  Prescriptions on File Prior to Visit   Medication Sig Dispense Refill     albuterol (PROAIR HFA) 90 mcg/actuation inhaler Inhale 2 puffs every 6 (six) hours as needed for wheezing.       citalopram (CELEXA) 10 MG tablet TAKE 1 TABLET (10 MG TOTAL) BY MOUTH DAILY. 30 tablet 0     hydrOXYzine pamoate (VISTARIL) 25 MG capsule Take 1-2 capsules (25-50 mg total) by mouth every 6 (six) hours as needed for itching. 90 capsule 1     lamoTRIgine (LAMICTAL) 100 MG tablet Take 1 tablet (100 mg total) by mouth daily. TAKE 1 TABLET BY MOUTH DAILY 30 tablet 0     No current facility-administered medications on file prior to visit.               Review of Systems:    Otherwise reminder of review of systems is negative    Coordination of Care:   More than 25 minutes spent on this visit  with more than 50% of time spent on coordination of care including: Educating patient about diagnosis, prognosis, side effects and benefits of medications, diet, exercise.  Time also spent on entering orders and preparing documentation for the visit.Time also spent providing supportive therapy regarding above issues.    This note was created using a dictation system. All typing errors or contextual distortion is unintentional and software inherent.

## 2021-06-18 NOTE — PROGRESS NOTES
Reason for visit Medication Management    Sleep Not able to sleep until 4 am  Depression 3/5 Pt states she doesn't have a reason as to why she is feeling down right now  Anxiety 2/5 Happens more at night time   Panic attacks None recently   SI/HI Pt denies both  Therapist Yes, [facility], Dr. Hawk    Side effects from medication Pt states she is having memory problems     Pt did graduate.

## 2021-06-18 NOTE — PROGRESS NOTES
Correct pharmacy verified with patient and confirmed in snapshot? [x] yes []no    Charge captured ? [x] yes  [] no    Medications Phoned  to Pharmacy [] yes [x]no  Name of Pharmacist:  List Medications, including dose, quantity and instructions      Medication Prescriptions given to patient   [] yes  [x] no   List the name of the drug the prescription was written for.       Medications ordered this visit were e-scribed.  Verified by order class [x] yes  [] no    Medication changes or discontinuations were communicated to patient's pharmacy: [] yes  [x] no    UA collected [] yes  [x] no    [place] Prescription Monitoring Program Reviewed? [x] yes  [] no    Referrals were made to:  Psychology     Future appointment was made: [x] yes  [] no    Dictation completed at time of chart check: [x] yes  [] no    I have checked the documentation for today s encounters and the above information has been reviewed and completed.

## 2021-06-19 NOTE — PROGRESS NOTES
"Psychiatric  Progress Note  Date of visit:8/8/2018         Discussion of Care and Treatment Recommendations:   This is a 23 y.o. female with history of bipolar affective disorder who presents to the clinic today for follow-up appointment for psychiatric medication management    Last visit 6/13/18.  Recommendation at last visit   1. .Recommends Psychotherapy:    She is seeing a therapist at [facility]   2.Highly recommend Call PCP or OBGYN  - Birth Control Options   3-Continue with current medication : Lamictal 100 mg daily , Celexa 10 mg daily , Hydroxyzine 25 mg - 50 mg every 6 hrs as needed - Anxiety   5- Ambulatory referral to psychology : Neuro psych evaluation for cognitive changes - increased forgetfulness   6- RTC- 4-6 weeks , call in between visit with any questions or concerns   Patient and I reviewed diagnosis and treatment plan and patient agrees with following recommendations:  Ongoing education given regarding diagnostic and treatment options with adequate verbalization of understanding.  Plan   1 .Recommends Psychotherapy:    She is seeing a therapist at [facility]    2.Highly recommend Call PCP or OBGYN  - Birth Control Options   3- Increase   Lamictal 150 mg daily , Continue Celexa 10 mg daily , Hydroxyzine 25 mg - 50 mg every 6 hrs as needed - Anxiety   5- Ambulatory referral to psychology : Neuro psych evaluation for cognitive changes - increased forgetfulness - has not made appointment yet. She will follow up and make one   6- RTC- 6 weeks , call in between visit with any questions or concerns          Diagnoses:     Bipolar affective Disorder        R/O ANIBAL, BPD    There is no problem list on file for this patient.        Chief Complaint / Subjective:    Chief complaint: \" I have lots of energy \"     History of Present Illness:   The patient statement: She has been experiencing what she is describing a cycling.  Today she is going through weeks of feeling down and depressed " and then lots of energy and less need for sleep.  Currently today she reports having a lot of energy has not been sleeping well but he is not tired.  She presented today is very talkative, smiling all the time, and very fidgety.  She however was pleasant and cooperative and her speech was in normal rate and tone.  She denies suicidal homicidal ideations.  She denies delusions paranoia.  She denies drug use.  She reports compliance with her current medications.  She is currently taking lamotrigine 100 mg and Celexa 10 mg and hydroxyzine 25 mg every 6 hours as needed.  Today we discussed increasing lamotrigine to 150 mg to manage symptoms as described above.  She will continue to utilize the medications as prescribed.  I will have her return to the clinic in 6 weeks for follow-up appointment.  During her last appointment she had mentioned that she fullness.  While this has not improved it has not become worse.  However she has not been able to secure an appointment for neuropsychiatric testing yet as her insurance would not cover our network psychologist.  She will try and coordinate that with her insurance company and hopes to get these done as soon as possible.  Patient is also yet to get on birth control like we had agreed on during our last visit.  She reports that she has not been engaging in sexual activity but she still has a boyfriend who is away in [place] and will be coming back to college this September.  She will make an appointment to see OB/GYN for birth control options.    Side effects of medications on unborn baby again extensively discussed with patient who endorsed understanding and while she confirms that she is currently not engaged in sexual activity and will be making an appointment with her  OB/GYN soon.  Would like to continue the same medications.  Have her return in 6 weeks for follow-up appointment and encouraged her to call in between with any questions or concerns and continue with her  "psychotherapy sessions.   mental Status Examination:   General: Adequate hygiene, cooperative, fidgety   Speech: Normal in rate and tone  Language: Intact  Thought process: Coherent  Thought content:                           Auditory hallucinations- absent                           Visual Hallucinations - Absent                           Delusions Absent                           Loose Associations:  No                          Suicidal thoughts: Absent                          Homicidal thoughts: Absent                       Affect: Neutral  Mood: Neutral   Intellectual functioning: Within normal limits  Memory: Within normal limits  Fund of knowledge: Average  Attention and concentration: Within normal limits  Gait: Steady  Psychomotor activity: Calm, no agitation  Muscles: No atrophy, no abnormal movements  InSight and judgment: Fair    Medication changes: See Above   Medication adherence: compliant  Medication side effects: absent  Information about medications: Side effects, benefits and alternative treatments discussed and patient agrees with capacity to do so.    Psychotherapy: Supportive therapy day-to-day living    Education: Diet, exercise, abstinence from drugs and alcohol, patient will not drive if sedated and medications or  under influence of any substance    Lab Results:   Personally reviewed and discussed with the patient    Lab Results   Component Value Date    WBC 4.4 08/11/2017    HGB 13.0 08/11/2017    HCT 39.0 08/11/2017     08/11/2017    ALT 7 08/11/2017    AST 15 08/11/2017     08/11/2017    K 3.8 08/11/2017     08/11/2017    CREATININE 0.82 08/11/2017    BUN 12 08/11/2017    CO2 22 08/11/2017    TSH 3.98 08/11/2017       Vital signs:    Vitals:    08/08/18 1335   BP: 115/63   Patient Site: Left Arm   Patient Position: Sitting   Cuff Size: Adult Regular   Pulse: 88   Weight: 113 lb (51.3 kg)   Height: 5' 8\" (1.727 m)     Allergies:   Allergies   Allergen Reactions     Other " Food Allergy      Bananas-tongue swells up and mouth gets itchy.           Medications:     Current Outpatient Prescriptions on File Prior to Visit   Medication Sig Dispense Refill     albuterol (PROAIR HFA) 90 mcg/actuation inhaler Inhale 2 puffs every 6 (six) hours as needed for wheezing.       citalopram (CELEXA) 10 MG tablet TAKE 1 TABLET (10 MG) BY MOUTH DAILY. 30 tablet 11     hydrOXYzine pamoate (VISTARIL) 25 MG capsule Take 1-2 capsules (25-50 mg total) by mouth every 6 (six) hours as needed for itching. 90 capsule 1     lamoTRIgine (LAMICTAL) 100 MG tablet TAKE 1 TABLET (100 MG TOTAL) BY MOUTH DAILY. 30 tablet 1     No current facility-administered medications on file prior to visit.               Review of Systems:    Otherwise reminder of review of systems is negative    Coordination of Care:   More than 25 minutes spent on this visit  with more than 50% of time spent on coordination of care including: Educating patient about diagnosis, prognosis, side effects and benefits of medications, diet, exercise.  Time also spent on entering orders and preparing documentation for the visit.Time also spent providing supportive therapy regarding above issues.    This note was created using a dictation system. All typing errors or contextual distortion is unintentional and software inherent.

## 2021-06-19 NOTE — PROGRESS NOTES
Pt here for follow up and medication management.    Mood last 2 weeks felt really low and past 3 days has had a increase in happiness.     Anxiety- more when feeling low denies panic attacks.    Denies SI/HI thoughts at this time.      [place]  below:   None Found

## 2021-06-19 NOTE — PROGRESS NOTES
Correct pharmacy verified with patient and confirmed in snapshot? [x] yes []no    Charge captured ? [x] yes  [] no    Medications Phoned  to Pharmacy [] yes [x]no  Name of Pharmacist:  List Medications, including dose, quantity and instructions      Medication Prescriptions given to patient   [] yes  [x] no   List the name of the drug the prescription was written for.       Medications ordered this visit were e-scribed.  Verified by order class [x] yes  [] no  Celexa 10 mg; Hydroxyzine 25 mg; Lamictal 100 mg  Medication changes or discontinuations were communicated to patient's pharmacy: [x] yes  [] no  Celexa 10 mg; Lamictal 100 mg     UA collected [] yes  [x] no    [place] Prescription Monitoring Program Reviewed? [x] yes  [] no    Referrals were made to:  None    Future appointment was made: [x] yes  [] no  09/19/18  Dictation completed at time of chart check: [x] yes  [] no    I have checked the documentation for today s encounters and the above information has been reviewed and completed.

## 2021-06-23 ENCOUNTER — APPOINTMENT (OUTPATIENT)
Dept: URGENT CARE | Facility: CLINIC | Age: 26
End: 2021-06-23
Payer: COMMERCIAL

## 2021-06-26 ENCOUNTER — HEALTH MAINTENANCE LETTER (OUTPATIENT)
Age: 26
End: 2021-06-26

## 2021-07-20 ENCOUNTER — VIRTUAL VISIT (OUTPATIENT)
Dept: FAMILY MEDICINE | Facility: CLINIC | Age: 26
End: 2021-07-20
Payer: COMMERCIAL

## 2021-07-20 DIAGNOSIS — F42.2 MIXED OBSESSIONAL THOUGHTS AND ACTS: Primary | ICD-10-CM

## 2021-07-20 DIAGNOSIS — F43.23 ADJUSTMENT DISORDER WITH MIXED ANXIETY AND DEPRESSED MOOD: ICD-10-CM

## 2021-07-20 PROCEDURE — 99214 OFFICE O/P EST MOD 30 MIN: CPT | Mod: 95 | Performed by: NURSE PRACTITIONER

## 2021-07-20 RX ORDER — VENLAFAXINE HYDROCHLORIDE 37.5 MG/1
37.5 TABLET, EXTENDED RELEASE ORAL DAILY
Qty: 30 TABLET | Refills: 1 | Status: SHIPPED | OUTPATIENT
Start: 2021-07-20

## 2021-07-20 ASSESSMENT — ANXIETY QUESTIONNAIRES
7. FEELING AFRAID AS IF SOMETHING AWFUL MIGHT HAPPEN: SEVERAL DAYS
IF YOU CHECKED OFF ANY PROBLEMS ON THIS QUESTIONNAIRE, HOW DIFFICULT HAVE THESE PROBLEMS MADE IT FOR YOU TO DO YOUR WORK, TAKE CARE OF THINGS AT HOME, OR GET ALONG WITH OTHER PEOPLE: SOMEWHAT DIFFICULT
6. BECOMING EASILY ANNOYED OR IRRITABLE: NOT AT ALL
2. NOT BEING ABLE TO STOP OR CONTROL WORRYING: MORE THAN HALF THE DAYS
5. BEING SO RESTLESS THAT IT IS HARD TO SIT STILL: SEVERAL DAYS
GAD7 TOTAL SCORE: 7
3. WORRYING TOO MUCH ABOUT DIFFERENT THINGS: MORE THAN HALF THE DAYS
1. FEELING NERVOUS, ANXIOUS, OR ON EDGE: SEVERAL DAYS

## 2021-07-20 ASSESSMENT — PATIENT HEALTH QUESTIONNAIRE - PHQ9
5. POOR APPETITE OR OVEREATING: NOT AT ALL
SUM OF ALL RESPONSES TO PHQ QUESTIONS 1-9: 5

## 2021-07-20 NOTE — PROGRESS NOTES
Gold is a 26 year old who is being evaluated via a billable video visit.      How would you like to obtain your AVS? MyChart  If the video visit is dropped, the invitation should be resent by: Text to cell phone: [phone]  Will anyone else be joining your video visit? No    Video Start Time: 2:20 PM    Assessment & Plan       ICD-10-CM    1. Mixed obsessional thoughts and acts  F42.2 venlafaxine (EFFEXOR-ER) 37.5 MG 24 hr tablet     MENTAL HEALTH REFERRAL  - Adult; Outpatient Treatment, Psychiatry; Individual/Couples/Family/Group Therapy/Health Psychology; [place]: [facility] [phone]; We will contact you to schedule the appointment or please call with any ...   2. Adjustment disorder with mixed anxiety and depressed mood  F43.23 MENTAL HEALTH REFERRAL  - Adult; Outpatient Treatment, Psychiatry; Individual/Couples/Family/Group Therapy/Health Psychology; [place]: [facility] [phone]; We will contact you to schedule the appointment or please call with any ...   3. History of self injurious behavior  Z91.5 MENTAL HEALTH REFERRAL  - Adult; Outpatient Treatment, Psychiatry; Individual/Couples/Family/Group Therapy/Health Psychology; [place]: [facility] [phone]; We will contact you to schedule the appointment or please call with any ...    due to possible hypomania after citalopram will trial SNRI this time, did not increase dose of lamictal so would consider again in future  Discussed titrating up meds, tips for success and not missing doses  Start counseling to address OCS in more detail, consider ADHD as differential   No SI but history of cutting 3 years ago,none since   Refer for diagnostic and Psych oversight, once stable I could resume medications    Discussed the pathophysiology of anxiety episodes and the various symptoms seen associated with anxiety episodes.  Discussed possible triggers including fatigue, depression, stress, and chemicals such as  alcohol, caffeine and certain drugs.  Discussed the treatment including an aerobic exercise program, adequate rest, and both rescue meds and maintenance meds.     If needs brdige of therapy let us know could reach out to [facility]             Return in about 2 months (around 9/20/2021) for for follow up visit, earlier if any concerns.    FABI Arellano CNP  [facility] BARAK Malcolm is a 26 year old who presents for the following health issues     HPI     Asthma Follow-Up  Exercise induced whz cardio, starting doing inahler before exercise in High school and really helps, mild symptoms and seasonal allergies    Was ACT completed today?  Yes    ACT Total Scores 7/20/2021   ACT TOTAL SCORE (Goal Greater than or Equal to 20) 21   In the past 12 months, how many times did you visit the emergency room for your asthma without being admitted to the hospital? 0   In the past 12 months, how many times were you hospitalized overnight because of your asthma? 0          How many days per week do you miss taking your asthma controller medication?  I do not have an asthma controller medication    Please describe any recent triggers for your asthma: exercise or sports    Have you had any Emergency Room Visits, Urgent Care Visits, or Hospital Admissions since your last office visit?  No      How many servings of fruits and vegetables do you eat daily?  2-3    On average, how many sweetened beverages do you drink each day (Examples: soda, juice, sweet tea, etc.  Do NOT count diet or artificially sweetened beverages)?   1    How many days per week do you exercise enough to make your heart beat faster? 4    How many minutes a day do you exercise enough to make your heart beat faster? 20 - 29    How many days per week do you miss taking your medication? 0    Depression and Anxiety Follow-Up  Long laundry list of diagnosis   OCD last year, testing done    How are you doing with your depression since  your last visit? Had improved and then with COVID worse see below     How are you doing with your anxiety since your last visit?  same    Are you having other symptoms that might be associated with depression or anxiety? Yes:  OCD    Have you had a significant life event? OTHER: COVID fears     Do you have any concerns with your use of alcohol or other drugs? No    Social History     Tobacco Use     Smoking status: Never Smoker     Smokeless tobacco: Never Used   Substance Use Topics     Alcohol use: Not on file     Drug use: Not on file     PHQ 7/20/2021   PHQ-9 Total Score 5   Q9: Thoughts of better off dead/self-harm past 2 weeks Not at all     ANIBAL-7 SCORE 7/20/2021   Total Score 7     No symptoms of keny since off the citalopram  OCD is the worst part, worsened after COVID, repeats things around the home, germs cause terrror  Paying attention and starting tasks also very challenging   Half a beer with a friend on the weekend perhaps, no drug use  History of cutting 3 years ago, no urges to self harm   Good support    Last PHQ-9 7/20/2021   1.  Little interest or pleasure in doing things 0   2.  Feeling down, depressed, or hopeless 0   3.  Trouble falling or staying asleep, or sleeping too much 1   4.  Feeling tired or having little energy 1   5.  Poor appetite or overeating 0   6.  Feeling bad about yourself 1   7.  Trouble concentrating 2   8.  Moving slowly or restless 0   Q9: Thoughts of better off dead/self-harm past 2 weeks 0   PHQ-9 Total Score 5   Difficulty at work, home, or with people Somewhat difficult     ANIBAL-7  7/20/2021   1. Feeling nervous, anxious, or on edge 1   2. Not being able to stop or control worrying 2   3. Worrying too much about different things 2   4. Trouble relaxing 0   5. Being so restless that it is hard to sit still 1   6. Becoming easily annoyed or irritable 0   7. Feeling afraid, as if something awful might happen 1   ANIBAL-7 Total Score 7   If you checked any problems, how  difficult have they made it for you to do your work, take care of things at home, or get along with other people? Somewhat difficult       Citalopram caused hypomania couple years ago  lamictal didn't seem to help a ton, stopped due to couldn't get to see Psych for so many appointments and had to worry about meds running out     Review of Systems   Constitutional, HEENT, cardiovascular, pulmonary, GI, , musculoskeletal, neuro, skin, endocrine and psych systems are negative, except as otherwise noted.      Objective           Vitals:  No vitals were obtained today due to virtual visit.    Physical Exam   GENERAL: Healthy, alert and no distress  EYES: Eyes grossly normal to inspection.  No discharge or erythema, or obvious scleral/conjunctival abnormalities.  RESP: No audible wheeze, cough, or visible cyanosis.  No visible retractions or increased work of breathing.    SKIN: Visible skin clear. No significant rash, abnormal pigmentation or lesions.  NEURO: Cranial nerves grossly intact.  Mentation and speech appropriate for age.  PSYCH: Mentation appears normal, affect normal/bright, judgement and insight intact, normal speech and appearance well-groomed.                Video-Visit Details    Type of service:  Video Visit    Video End Time:3:10 PM    Originating Location (pt. Location): Home    Distant Location (provider location):  [facility] [place]     Platform used for Video Visit: [employer]ouds[employer]

## 2021-07-21 ENCOUNTER — MYC MEDICAL ADVICE (OUTPATIENT)
Dept: FAMILY MEDICINE | Facility: CLINIC | Age: 26
End: 2021-07-21

## 2021-07-21 DIAGNOSIS — J45.20 MILD INTERMITTENT ASTHMA WITHOUT COMPLICATION: Primary | ICD-10-CM

## 2021-07-21 RX ORDER — ALBUTEROL SULFATE 90 UG/1
2 AEROSOL, METERED RESPIRATORY (INHALATION) EVERY 6 HOURS
Qty: 8.5 G | Refills: 0 | Status: SHIPPED | OUTPATIENT
Start: 2021-07-21

## 2021-07-21 ASSESSMENT — ANXIETY QUESTIONNAIRES: GAD7 TOTAL SCORE: 7

## 2021-07-21 ASSESSMENT — ASTHMA QUESTIONNAIRES: ACT_TOTALSCORE: 21

## 2021-07-21 NOTE — TELEPHONE ENCOUNTER
Routing refill request to provider for review/approval because:  Medication is reported/historical  See pt MyChart message  Asthma Follow-Up  Exercise induced whz cardio, starting doing inahler before exercise in High school and really helps, mild symptoms and seasonal allergies  ACT done 7/20/21 score 21    Med cued    Kaitlynn Barahona, RN   [facility]

## 2021-08-06 DIAGNOSIS — F42.2 MIXED OBSESSIONAL THOUGHTS AND ACTS: ICD-10-CM

## 2021-08-06 NOTE — TELEPHONE ENCOUNTER
Reason for Call:  PRIOR AUTHORIZATION     Do you use a [facility]?  Name of the pharmacy and phone number for the current request:  NOT LISTED.     Name of the medication requested: medical: PRIOR AUTHORIZATION: venlafaxine (EFFEXOR-ER) 37.5 MG 24 hr tablet     Other request: NA    Can we leave a detailed message on this number? YES    Phone number patient can be reached at: Home number on file [phone] (home)    Best Time: ANY    Call taken on 8/6/2021 at 9:05 AM by Jacqueline Mora

## 2021-08-06 NOTE — TELEPHONE ENCOUNTER
Central Prior Authorization Team   Phone: [phone]      PA Initiation    Medication: venlafaxine (EFFEXOR-ER) 37.5 MG 24 hr tablet  Insurance Company: [employer] - Phone [phone] Fax [phone]  Pharmacy Filling the Rx: [facility], [address]  Filling Pharmacy Phone: [phone]  Filling Pharmacy Fax: [phone]  Start Date: 8/6/2021

## 2021-08-09 NOTE — TELEPHONE ENCOUNTER
PRIOR AUTHORIZATION DENIED    Medication: venlafaxine (EFFEXOR-ER) 37.5 MG 24 hr tablet - Denied    Denial Date: 8/6/2021    Denial Rational: The patient needs to have tried/failed at least 2 preferred medications. If the patient cannot try any of the preferred options, the provider must reason as to why patient cannot.     Preferred Medications:    Bupropion - Immediate Release, Sustained Release, Extended Release 12- hour (ER), Extended Release 24 hour (XL) tablets    Mirtazapine (tablets, orally disintegrating tablet (ODT)    Venlafaxine - Immediate Release Tablets, Extended Release Capsules        Appeal Information: If the provider would like to appeal this denial, please provide a letter of medical necessity. Please also include any therapies that the patient has tried and their outcomes. The patient's insurance company will also require the provider to address why the insurance preferred options are not appropriate in the patient's therapy.  The reason could be that the preferred options will harm the patient; either physically or mentally. They are contraindicated to the patient; or the patient has already been taking the requested medication and changing the therapy would change the outcome of their therapy.    Once it has been completed and placed in the patient's chart, notify the Central PA Team ([facility] PA MED) and the appeal can be initiated on behalf of the patient and provider.

## 2021-08-13 ENCOUNTER — MYC MEDICAL ADVICE (OUTPATIENT)
Dept: FAMILY MEDICINE | Facility: CLINIC | Age: 26
End: 2021-08-13

## 2021-08-16 RX ORDER — VENLAFAXINE HYDROCHLORIDE 37.5 MG/1
37.5 TABLET, EXTENDED RELEASE ORAL DAILY
Qty: 30 TABLET | Refills: 1 | Status: CANCELLED | OUTPATIENT
Start: 2021-08-16

## 2021-08-16 RX ORDER — VENLAFAXINE 25 MG/1
25 TABLET ORAL 2 TIMES DAILY
Qty: 60 TABLET | Refills: 3 | Status: CANCELLED | OUTPATIENT
Start: 2021-08-16

## 2021-08-16 NOTE — TELEPHONE ENCOUNTER
[employer] message to pt  PA info has been sent to the PCP/POD    Kaitlynn Barahona RN   [facility]

## 2021-08-16 NOTE — TELEPHONE ENCOUNTER
Could you please let Gold know PA was denied for the long-acting Effexor, but we can change it to an immediate release that is taken twice daily; at a slightly higher dose. Would she like this called in for her?

## 2021-08-17 NOTE — TELEPHONE ENCOUNTER
Left detailed message on her personal vm with providers advise    Kaitlynn Barahona RN   [facility]

## 2021-09-02 ENCOUNTER — TELEPHONE (OUTPATIENT)
Dept: FAMILY MEDICINE | Facility: CLINIC | Age: 26
End: 2021-09-02

## 2021-09-02 DIAGNOSIS — F31.60 BIPOLAR 1 DISORDER, MIXED (H): Primary | ICD-10-CM

## 2021-09-02 DIAGNOSIS — F41.1 GENERALIZED ANXIETY DISORDER: ICD-10-CM

## 2021-09-02 RX ORDER — VENLAFAXINE HYDROCHLORIDE 37.5 MG/1
37.5 CAPSULE, EXTENDED RELEASE ORAL DAILY
Qty: 90 CAPSULE | Refills: 1 | Status: SHIPPED | OUTPATIENT
Start: 2021-09-02

## 2021-09-02 NOTE — TELEPHONE ENCOUNTER
Guadalupe and POD,    Please see [employer] messages.    PA for long-acting effexor was denied and pt not interested in trying quick-release effexor. PA denial is in refill encounter from 8/6:       Denial Rational: The patient needs to have tried/failed at least 2 preferred medications. If the patient cannot try any of the preferred options, the provider must reason as to why patient cannot.      Preferred Medications:    Bupropion - Immediate Release, Sustained Release, Extended Release 12- hour (ER), Extended Release 24 hour (XL) tablets    Mirtazapine (tablets, orally disintegrating tablet (ODT)    Venlafaxine - Immediate Release Tablets, Extended Release Capsules    Sheridan Brizuela RN  [facility]

## 2021-10-16 ENCOUNTER — HEALTH MAINTENANCE LETTER (OUTPATIENT)
Age: 26
End: 2021-10-16

## 2022-07-23 ENCOUNTER — HEALTH MAINTENANCE LETTER (OUTPATIENT)
Age: 27
End: 2022-07-23

## 2022-09-08 ENCOUNTER — OFFICE VISIT (OUTPATIENT)
Dept: URGENT CARE | Facility: URGENT CARE | Age: 27
End: 2022-09-08
Payer: COMMERCIAL

## 2022-09-08 VITALS
OXYGEN SATURATION: 98 % | TEMPERATURE: 98.2 F | DIASTOLIC BLOOD PRESSURE: 74 MMHG | SYSTOLIC BLOOD PRESSURE: 114 MMHG | HEART RATE: 83 BPM

## 2022-09-08 DIAGNOSIS — R22.0 TONGUE SWELLING: Primary | ICD-10-CM

## 2022-09-08 PROCEDURE — 99214 OFFICE O/P EST MOD 30 MIN: CPT | Performed by: PHYSICIAN ASSISTANT

## 2022-09-08 RX ORDER — EPINEPHRINE 0.15 MG/.3ML
0.15 INJECTION INTRAMUSCULAR PRN
Qty: 2 EACH | Refills: 0 | Status: SHIPPED | OUTPATIENT
Start: 2022-09-08

## 2022-09-08 NOTE — PATIENT INSTRUCTIONS
Patient presents with swelling of tongue and tingling mouth which resolved while she was waiting. She does not endorse a specific cause. States she has had similar feeling when she ate bananas which is why she cut that fruit from her diet. Unsure if this is anaphylactic like reaction, oral allergy syndrome, or other cause. It would be unusual for anaphylaxis to resolve on its own that quickly. She has no symptoms currently. Will rx an epi pen for future episodes to be safe. Conservative measures discussed including over-the-counter antihistamines (Zyrtec or Allegra).  Try to identify potential allergens and do your best to avoid them. See your primary care provider if symptoms do not improve in 3 days. Seek emergency care if you develop severe swelling, difficulty swallowing, or difficulty breathing.

## 2022-09-08 NOTE — PROGRESS NOTES
URGENT CARE VISIT:    SUBJECTIVE:   Candace Moy is a 27 year old adult presenting with a chief complaint of tongue swelling and mouth tingling.  Onset was 1 hour(s) ago.   Gold Moy denies the following symptoms: fever, chills, shortness of breath and sore throat  Course of illness is resolved.    Treatment measures tried include None tried with no relief of symptoms.  Predisposing factors include cannot recall eating anything. No raw fruits ingested.    PMH: History reviewed. No pertinent past medical history.  Allergies: Banana, Augmentin, and Other food allergy   Medications:   Current Outpatient Medications   Medication Sig Dispense Refill     albuterol (PROAIR HFA/PROVENTIL HFA/VENTOLIN HFA) 108 (90 Base) MCG/ACT inhaler Inhale 2 puffs into the lungs every 6 hours 8.5 g 0     EPINEPHrine (EPIPEN JR) 0.15 MG/0.3ML injection 2-pack Inject 0.3 mLs (0.15 mg) into the muscle as needed for anaphylaxis May repeat one time in 5-15 minutes if response to initial dose is inadequate. 2 each 0     VITAMIN D PO        Fexofenadine HCl (ALLERGY 24-HR PO)  (Patient not taking: Reported on 9/8/2022)       venlafaxine (EFFEXOR-ER) 37.5 MG 24 hr tablet Take 1 tablet (37.5 mg) by mouth daily (Patient not taking: Reported on 9/8/2022) 30 tablet 1     venlafaxine (EFFEXOR-XR) 37.5 MG 24 hr capsule Take 1 capsule (37.5 mg) by mouth daily (Patient not taking: Reported on 9/8/2022) 90 capsule 1     Social History:   Social History     Tobacco Use     Smoking status: Never Smoker     Smokeless tobacco: Never Used   Substance Use Topics     Alcohol use: Not on file       ROS:  Review of systems negative except as stated above.    OBJECTIVE:  /74 (BP Location: Left arm, Patient Position: Sitting, Cuff Size: Adult Small)   Pulse 83   Temp 98.2  F (36.8  C) (Temporal)   SpO2 98%   GENERAL APPEARANCE: healthy, alert and no distress  EYES: EOMI,  PERRL, conjunctiva clear  HENT: ear canals and TM's normal.  Nose and  mouth without ulcers, erythema or lesions  NECK: supple, nontender, no lymphadenopathy  RESP: lungs clear to auscultation - no rales, rhonchi or wheezes  CV: regular rates and rhythm, normal S1 S2, no murmur noted  SKIN: no suspicious lesions or rashes      ASSESSMENT:    ICD-10-CM    1. Tongue swelling  R22.0 EPINEPHrine (EPIPEN JR) 0.15 MG/0.3ML injection 2-pack       PLAN:  30 minutes spent on the date of the encounter doing chart review, review of outside records, patient visit and documentation.   Patient Instructions   Patient presents with swelling of tongue and tingling mouth which resolved while she was waiting. She does not endorse a specific cause. States she has had similar feeling when she ate bananas which is why she cut that fruit from her diet. Unsure if this is anaphylactic like reaction, oral allergy syndrome, or other cause. It would be unusual for anaphylaxis to resolve on its own that quickly. She has no symptoms currently. Will rx an epi pen for future episodes to be safe. Conservative measures discussed including over-the-counter antihistamines (Zyrtec or Allegra).  Try to identify potential allergens and do your best to avoid them. See your primary care provider if symptoms do not improve in 3 days. Seek emergency care if you develop severe swelling, difficulty swallowing, or difficulty breathing.     Patient verbalized understanding and is agreeable to plan. The patient was discharged ambulatory and in stable condition.    Yudi Vincent PA-C ....................  9/8/2022   4:36 PM

## 2022-09-09 ENCOUNTER — NURSE TRIAGE (OUTPATIENT)
Dept: NURSING | Facility: CLINIC | Age: 27
End: 2022-09-09

## 2022-09-09 NOTE — TELEPHONE ENCOUNTER
Purvi with [facility] in [facility] is calling about epipen.  It is prescribed for a rolly dose and dose is too low for patient.  The prescription is only prescribed for someone less than 30 kilograms.  Pharmacy is requesting to speak with [facility] direct.  FNA transferred to [facility].      Reason for Disposition    Information only question and nurse able to answer    Additional Information    Negative: Nursing judgment    Negative: Nursing judgment    Negative: Nursing judgment    Negative: Nursing judgment    Protocols used: INFORMATION ONLY CALL - NO TRIAGE-A-OH

## 2022-09-09 NOTE — TELEPHONE ENCOUNTER
See message below.    Epi pen needs to be sent in as adult dose, not Jr.    Please resend to pharmacy.    ELENA JaegerN RN  [facility]

## 2022-10-01 ENCOUNTER — HEALTH MAINTENANCE LETTER (OUTPATIENT)
Age: 27
End: 2022-10-01

## 2023-08-06 ENCOUNTER — HEALTH MAINTENANCE LETTER (OUTPATIENT)
Age: 28
End: 2023-08-06

## 2024-08-22 NOTE — PROGRESS NOTES
[facility] Follow Up Note    Assessment/Plan:    1.  Symptoms of depression, anxiety and mood swings with episodes of bursts of energy    Since patient has been on citalopram she feels that she is feeling more jittery, needing to move around all the time, and feeling burst of energy and taking on too many projects which are unrealistic.  I am concerned that she has hypo-keny symptoms currently.  Her depression is still moderate on PHQ 9.  For now we discussed decreasing her citalopram to 10 mg and starting on Lamictal to help stabilize her mood and improve depressive symptoms.  Patient currently is nonsuicidal.  For anxiety I recommended that she takes Vistaril to 3 times a day.  Discussed that she needs to see a psychiatrist in the near future to further give her diagnoses and treat her.  I suspect she has a mixed disorder,bipolar, possibly borderline personality disorder.  Patient's appears to be thin but denies dieting or feeling that she needs to lose weight so no eating disorder.  She will start with seeing our psychologist for an intake and hopefully can get in with psychiatrist in the next few weeks.  Meanwhile we will change her medications as above and she will follow-up with me in a month.  We discussed side effects of  Lamictal.  We will start on a very gradual taper and if she develops rash or worsening symptoms she will stop it.  She is aware that she needs to be on birth control if she decides to be sexually active.  - HM1(CBC and Differential)  - Thyroid Stimulating Hormone (TSH)  - Comprehensive Metabolic Panel  - HM1 (CBC with Diff)  - Ambulatory referral to Psychology  - lamoTRIgine (LAMICTAL) 25 MG tablet; Weeks 1 and 2: 25 mg once daily; Weeks 3 and 4: 50 mg once daily; Week 5: 100 mg once daily  Dispense: 60 tablet; Refill: 1  - Thyroid Stimulating Hormone (TSH)    Keyonna Patel MD    Chief Complaint:  Chief Complaint   Patient presents with     Fatigue     No energy, feels  RIGHT FOOR WOUND CULTURE SWAB DONE SENT TOLAB like immune system is suppressed     Anxiety     Has hx of anxiety and depression, but has gotten much worse over the last couple of months. No specific trigger     Headache     Insomnia       History of Present Illness:  Candace is a 22 y.o. female with history of exercise-induced asthma who is currently here to meet me for the first time and discuss problems with anxiety, depression and lack of energy.    Patient feels that her symptoms have gotten much worse last semester and she had a panic attack and she was seen by a provider at her college.  Because she was depressed and was having panic attacks and started her on citalopram 20 mg and hydroxyzine.  She has been on this treatment for 3 months now.  Her PHQ 9 is still showing moderate depression and she did not feel that it has improved her depressive symptoms.  Patient also has moderate anxiety and he still have intermittent panic attacks.  Also since she has been on citalopram she feels that she gets bursts of energy when she is extremely energetic and takes on a lot of projects which are unrealistic to fulfill and then crashes later on and also feels jittery, feels that she constantly needs to move and be outside, and has mild insomnia.  Over the summer her panic attacks have been slightly better because she has not been in school and has not needed to worry about academics in public speaking but her school is starting in a few weeks.  Also on exam noted several cutting marks in her upper extremities.  She did report the dose of self-inflicted inflicted, there is secondary to impulsivity and are not intended to hurt her.  She denies any suicidal ideation.  She felt more inclined of cutting herself in the last 4-5 months, she does not feel that the cutting behavior has gotten worse with citalopram.    Currently patient drinks 2 alcoholic beverages 4 times a week.  She denies any smoking or drug use.  Hydroxyzine has helped with anxiety.    Patient denies  "any symptoms of OCD, paranoia or hallucinations.    Patient denies any family history of depression, anxiety or bipolar disorder.  She feels that she has had a mood disorder since middle school but has never been evaluated for it or treated for it.    Review of Systems:  A comprehensive review of systems was performed and was otherwise negative.  Patient has exercise-induced asthma and uses albuterol as needed.  No persistent symptoms.    PFSH:  Social History: Reviewed  History   Smoking Status     Never Smoker   Smokeless Tobacco     Never Used     Social History     Social History Narrative    Patient lives with one roommate.  She is a senior in [employer] and majors in English literature.  She is from [place] originally.       Past History: Reviewed  Current Outpatient Prescriptions   Medication Sig Dispense Refill     albuterol (PROAIR HFA) 90 mcg/actuation inhaler Inhale 2 puffs every 6 (six) hours as needed for wheezing.       citalopram (CELEXA) 20 MG tablet Take 20 mg by mouth daily.       hydrOXYzine (VISTARIL) 25 MG capsule Take 25-50 mg by mouth every 6 (six) hours as needed for itching.       lamoTRIgine (LAMICTAL) 25 MG tablet Weeks 1 and 2: 25 mg once daily; Weeks 3 and 4: 50 mg once daily; Week 5: 100 mg once daily 60 tablet 1     No current facility-administered medications for this visit.        Family History: Reviewed    Physical Exam:    Vitals:    08/11/17 1244   BP: 100/70   Pulse: 68   Resp: 8   Weight: 112 lb (50.8 kg)   Height: 5' 6\" (1.676 m)     Wt Readings from Last 3 Encounters:   08/11/17 112 lb (50.8 kg)     Body mass index is 18.08 kg/(m^2).    Constitutional:  Reveals a pleasant young female who is very fidgety, moving her legs a lot, she did not does not have any flight of ideas but when she talks her speech is very rapid, she looks moderately anxious..  Vitals:  Per nursing notes.  HEENT:No cervical LAD, no thyromegaly,  conjunctiva is pink, no scleral icterus, TMs are " visualized and normal bl, oropharynx is clear, no exudates,   Cardiac:  Regular rate and rhythm,no murmurs, rubs, or gallops. Legs without edema. Palpation of the radial pulse regular.  Lungs: Clear to auscultation bl.  Respiratory effort normal.  Abdomen:positive BS, soft, nontender, nondistended.  No hepato-splenomagaly  Skin: Healed scars from sharp object on both upper arms and forearms noted, no cutting over her wrists.  Neurologic:  Cranial nerves II-XII intact.     Psychiatric: As above, thought processes linear, patient has good eye contact, no flight of ideas but when she talks his speech is rapid, she is very few EGD.    Data Review:    Analysis and Summary of Old Records (2): No    Records Requested (1): Yes    Other History Summarized (from other people in the room) (2): No    Radiology Tests Summarized (XRAY/CT/MRI/DXA) (1): No    Labs Reviewed (1): No    Medicine Tests Reviewed (EKG/ECHO/COLONOSCOPY/EGD) (1): No    Independent Review of EKG or X-RAY (2): No    Total visit time: 25 minutes

## 2024-09-29 ENCOUNTER — HEALTH MAINTENANCE LETTER (OUTPATIENT)
Age: 29
End: 2024-09-29